# Patient Record
Sex: FEMALE | Race: WHITE | Employment: UNEMPLOYED | URBAN - METROPOLITAN AREA
[De-identification: names, ages, dates, MRNs, and addresses within clinical notes are randomized per-mention and may not be internally consistent; named-entity substitution may affect disease eponyms.]

---

## 2017-01-04 ENCOUNTER — ROUTINE PRENATAL (OUTPATIENT)
Dept: OBGYN CLINIC | Age: 32
End: 2017-01-04

## 2017-01-04 VITALS
DIASTOLIC BLOOD PRESSURE: 70 MMHG | HEIGHT: 65 IN | BODY MASS INDEX: 32.22 KG/M2 | WEIGHT: 193.4 LBS | SYSTOLIC BLOOD PRESSURE: 110 MMHG

## 2017-01-04 DIAGNOSIS — Z34.00 PRENATAL CARE OF PRIMIGRAVIDA, ANTEPARTUM: Primary | ICD-10-CM

## 2017-01-04 NOTE — PATIENT INSTRUCTIONS
Week 39 of Your Pregnancy: Care Instructions  Your Care Instructions    During these final weeks, you may feel anxious to see your new baby. [place] babies often look different from what you see in pictures or movies. Right after birth, their heads may have a strange shape. Their eyes may be puffy. And their genitals may be swollen. They may also have very dry skin, or red marks on the eyelids, nose, or neck. Still, most parents think their babies are beautiful. Follow-up care is a key part of your treatment and safety. Be sure to make and go to all appointments, and call your doctor if you are having problems. It's also a good idea to know your test results and keep a list of the medicines you take. How can you care for yourself at home? Prepare to breastfeed  · If you are breastfeeding, continue to eat healthy foods. · Avoid alcohol, cigarettes, and drugs. This includes prescription and over-the-counter medicines. · You can help prevent sore nipples if you feed your baby in the correct position. Nurses will help you learn to do this. · Your  will need to be fed about every 1½ to 3 hours. Choose the right birth control after your baby is born  · Women who are breastfeeding can still get pregnant. Use birth control if you don't want to get pregnant. · Intrauterine devices (IUDs) work for women who want to wait at least 2 years before getting pregnant again. They are safe to use while you are breastfeeding. · Depo-Provera can be used while you are breastfeeding. It is a shot you get every 3 months. · Birth control pills work well. But you need a different kind of pill while you are breastfeeding. And when you start taking these pills, you need to make sure to use another type of birth control until you start your second pack. · Diaphragms, cervical caps, tubal implants, and condoms with spermicide work less well after birth.  If you have a diaphragm or cervical cap, you will need to have it refitted. · Tubal ligation (tying your tubes) and vasectomy are both permanent. These are good options if you are sure you are done having children. Where can you learn more? Go to http://angélica-gulshan.info/. Enter T749 in the search box to learn more about \"Week 39 of Your Pregnancy: Care Instructions. \"  Current as of: May 30, 2016  Content Version: 11.1  © 1269-3817 Emefcy, [employer]. Care instructions adapted under license by [employer] (which disclaims liability or warranty for this information). If you have questions about a medical condition or this instruction, always ask your healthcare professional. [address] any warranty or liability for your use of this information.

## 2017-01-04 NOTE — MR AVS SNAPSHOT
Visit Information Date & Time Provider Department Dept. Phone Encounter #  
 1/4/2017  2:50 PM Rogelio Fitzgerald MD [facility] 90 [id number] Your Appointments 1/10/2017  3:10 PM  
OB VISIT with Rogelio Fitzgerald MD  
Sander Spivey (Gbariela Euceda) Appt Note: 1 wk fob  
 [address] [facility] [address] [facility] [id number]  
[facility] [address] Upcoming Health Maintenance Date Due  
 PAP AKA CERVICAL CYTOLOGY 2/5/2017 Allergies as of 1/4/2017  Review Complete On: 1/4/2017 By: Roel Cordova LPN No Known Allergies Current Immunizations  Never Reviewed Name Date Influenza Vaccine (Quad) PF 10/31/2016 Tdap 10/31/2016 Not reviewed this visit Vitals BP Height(growth percentile) Weight(growth percentile) LMP BMI OB Status 110/70 (BP 1 Location: Left arm, BP Patient Position: Sitting) 5' 5\" (1.651 m) 193 lb 6.4 oz (87.7 kg) 04/08/2016 (Approximate) 32.18 kg/m2 Pregnant Smoking Status Never Smoker Vitals History BMI and BSA Data Body Mass Index Body Surface Area  
 32.18 kg/m 2 2.01 m 2 Preferred Pharmacy Pharmacy Name Phone [facility], [address] [facility] Your Updated Medication List  
  
   
This list is accurate as of: 1/4/17  3:23 PM.  Always use your most recent med list.  
  
  
  
  
 FISH OIL PO Take  by mouth. ondansetron 4 mg disintegrating tablet Commonly known as:  ZOFRAN ODT Take 1 Tab by mouth every eight (8) hours as needed for Nausea. PRENATAL VITAMIN PO Take  by mouth. PROBIOTIC PO Take  by mouth. ZANTAC 75 75 mg tablet Generic drug:  raNITIdine Take 75 mg by mouth two (2) times a day. Patient Instructions Week 39 of Your Pregnancy: Care Instructions Your Care Instructions During these final weeks, you may feel anxious to see your new baby.  babies often look different from what you see in pictures or movies. Right after birth, their heads may have a strange shape. Their eyes may be puffy. And their genitals may be swollen. They may also have very dry skin, or red marks on the eyelids, nose, or neck. Still, most parents think their babies are beautiful. Follow-up care is a key part of your treatment and safety. Be sure to make and go to all appointments, and call your doctor if you are having problems. It's also a good idea to know your test results and keep a list of the medicines you take. How can you care for yourself at home? Prepare to breastfeed · If you are breastfeeding, continue to eat healthy foods. · Avoid alcohol, cigarettes, and drugs. This includes prescription and over-the-counter medicines. · You can help prevent sore nipples if you feed your baby in the correct position. Nurses will help you learn to do this. · Your  will need to be fed about every 1½ to 3 hours. Choose the right birth control after your baby is born · Women who are breastfeeding can still get pregnant. Use birth control if you don't want to get pregnant. · Intrauterine devices (IUDs) work for women who want to wait at least 2 years before getting pregnant again. They are safe to use while you are breastfeeding. · Depo-Provera can be used while you are breastfeeding. It is a shot you get every 3 months. · Birth control pills work well. But you need a different kind of pill while you are breastfeeding. And when you start taking these pills, you need to make sure to use another type of birth control until you start your second pack. · Diaphragms, cervical caps, tubal implants, and condoms with spermicide work less well after birth. If you have a diaphragm or cervical cap, you will need to have it refitted. · Tubal ligation (tying your tubes) and vasectomy are both permanent. These are good options if you are sure you are done having children. Where can you learn more? Go to http://angélica-gulshan.info/. Enter F055 in the search box to learn more about \"Week 39 of Your Pregnancy: Care Instructions. \" Current as of: May 30, 2016 Content Version: 11.1 © 4810-8620 EverCharge. Care instructions adapted under license by [employer] (which disclaims liability or warranty for this information). If you have questions about a medical condition or this instruction, always ask your healthcare professional. [address] any warranty or liability for your use of this information. Introducing [facility] & HEALTH SERVICES! Dear Neli Soto: 
Thank you for requesting a [employer] account. Our records indicate that you already have an active [employer] account. You can access your account anytime at [email or web address] Did you know that you can access your hospital and ER discharge instructions at any time in [employer]? You can also review all of your test results from your hospital stay or ER visit. Additional Information If you have questions, please visit the Frequently Asked Questions section of the [employer] website at [email or web address] Remember, [employer] is NOT to be used for urgent needs. For medical emergencies, dial 911. Now available from your iPhone and Android! Please provide this summary of care documentation to your next provider. Your primary care clinician is listed as FALLON ALFONSO. If you have any questions after today's visit, please call [phone].

## 2017-01-04 NOTE — PROGRESS NOTES
[facility]  [email or web address]  [phone]    Ana Laura Sauceda MD, FACOG     Follow-up OB visit    Vitals:    17 1515   BP: 110/70   Weight: 193 lb 6.4 oz (87.7 kg)   Height: 5' 5\" (1.651 m)       The patient reports the following concerns: contractions that come and go.cold symptoms. +cough/stuffy nose. Flu vaccine: received this season  Tdap: complete  (select n/a if first or second trimester)    See PN flowsheet for exam    28 y.o.  38w5d   Encounter Diagnosis   Name Primary?  Prenatal care of primigravida, antepartum Yes        [] SAB/bleeding precautions reviewed   [] PTL/PPROM precautions reviewed   [] Labor precautions reviewed   [] Fetal kick counts discussed   [] Labs reviewed with patient   [] Maira Garcia precautions reviewed   [] Consent reviewed   [] Handouts given to pt   [] Glucola handout    [] GBS/labor/Magic Hour handout   []    []    []    []    Follow-up Disposition:  Return in about 1 week (around 2017) for Follow up OB visit. No orders of the defined types were placed in this encounter.       Ana Laura Sauceda MD

## 2017-01-10 ENCOUNTER — ROUTINE PRENATAL (OUTPATIENT)
Dept: OBGYN CLINIC | Age: 32
End: 2017-01-10

## 2017-01-10 ENCOUNTER — TELEPHONE (OUTPATIENT)
Dept: OBGYN CLINIC | Age: 32
End: 2017-01-10

## 2017-01-10 VITALS
DIASTOLIC BLOOD PRESSURE: 78 MMHG | SYSTOLIC BLOOD PRESSURE: 100 MMHG | HEIGHT: 65 IN | WEIGHT: 196 LBS | BODY MASS INDEX: 32.65 KG/M2

## 2017-01-10 DIAGNOSIS — Z34.00 PRENATAL CARE OF PRIMIGRAVIDA, ANTEPARTUM: Primary | ICD-10-CM

## 2017-01-10 NOTE — MR AVS SNAPSHOT
Visit Information Date & Time Provider Department Dept. Phone Encounter #  
 1/10/2017  3:10 PM Abraham Garza MD [facility] [phone] Upcoming Health Maintenance Date Due  
 PAP AKA CERVICAL CYTOLOGY 2017 Allergies as of 1/10/2017  Review Complete On: 1/10/2017 By: Pastor Eligio LPN No Known Allergies Current Immunizations  Never Reviewed Name Date Influenza Vaccine (Quad) PF 10/31/2016 Tdap 10/31/2016 Not reviewed this visit Vitals BP Height(growth percentile) Weight(growth percentile) LMP BMI OB Status 100/78 5' 5\" (1.651 m) 196 lb (88.9 kg) 2016 (Approximate) 32.62 kg/m2 Pregnant Smoking Status Never Smoker BMI and BSA Data Body Mass Index Body Surface Area  
 32.62 kg/m 2 2.02 m 2 Preferred Pharmacy Pharmacy Name Phone [facility], [address]  Your Updated Medication List  
  
   
This list is accurate as of: 1/10/17  3:39 PM.  Always use your most recent med list.  
  
  
  
  
 FISH OIL PO Take  by mouth. ondansetron 4 mg disintegrating tablet Commonly known as:  ZOFRAN ODT Take 1 Tab by mouth every eight (8) hours as needed for Nausea. PRENATAL VITAMIN PO Take  by mouth. PROBIOTIC PO Take  by mouth. ZANTAC 75 75 mg tablet Generic drug:  raNITIdine Take 75 mg by mouth two (2) times a day. Patient Instructions Week 39 of Your Pregnancy: Care Instructions Your Care Instructions During these final weeks, you may feel anxious to see your new baby.  babies often look different from what you see in pictures or movies. Right after birth, their heads may have a strange shape. Their eyes may be puffy. And their genitals may be swollen. They may also have very dry skin, or red marks on the eyelids, nose, or neck.  Still, most parents think their babies are beautiful. Follow-up care is a key part of your treatment and safety. Be sure to make and go to all appointments, and call your doctor if you are having problems. It's also a good idea to know your test results and keep a list of the medicines you take. How can you care for yourself at home? Prepare to breastfeed · If you are breastfeeding, continue to eat healthy foods. · Avoid alcohol, cigarettes, and drugs. This includes prescription and over-the-counter medicines. · You can help prevent sore nipples if you feed your baby in the correct position. Nurses will help you learn to do this. · Your  will need to be fed about every 1½ to 3 hours. Choose the right birth control after your baby is born · Women who are breastfeeding can still get pregnant. Use birth control if you don't want to get pregnant. · Intrauterine devices (IUDs) work for women who want to wait at least 2 years before getting pregnant again. They are safe to use while you are breastfeeding. · Depo-Provera can be used while you are breastfeeding. It is a shot you get every 3 months. · Birth control pills work well. But you need a different kind of pill while you are breastfeeding. And when you start taking these pills, you need to make sure to use another type of birth control until you start your second pack. · Diaphragms, cervical caps, tubal implants, and condoms with spermicide work less well after birth. If you have a diaphragm or cervical cap, you will need to have it refitted. · Tubal ligation (tying your tubes) and vasectomy are both permanent. These are good options if you are sure you are done having children. Where can you learn more? Go to http://angélica-gulshan.info/. Enter I256 in the search box to learn more about \"Week 39 of Your Pregnancy: Care Instructions. \" Current as of: May 30, 2016 Content Version: 11.1 © 8587-2430 Healthwise, Incorporated. Care instructions adapted under license by Internal Gaming (which disclaims liability or warranty for this information). If you have questions about a medical condition or this instruction, always ask your healthcare professional. [address] any warranty or liability for your use of this information. Introducing [facility] & HEALTH SERVICES! Dear Fiona Dunbar: 
Thank you for requesting a [employer] account. Our records indicate that you already have an active [employer] account. You can access your account anytime at [email or web address] Did you know that you can access your hospital and ER discharge instructions at any time in Global Loc[employer]? You can also review all of your test results from your hospital stay or ER visit. Additional Information If you have questions, please visit the Frequently Asked Questions section of the Global [employer] website at [email or web address] Remember, [employer] is NOT to be used for urgent needs. For medical emergencies, dial 911. Now available from your iPhone and Android! Please provide this summary of care documentation to your next provider. Your primary care clinician is listed as FALLON ALFONSO. If you have any questions after today's visit, please call [phone].

## 2017-01-10 NOTE — PROGRESS NOTES
[facility]  [email or web address]  [phone]    Darryle Silvius, MD, FACOG     Follow-up OB visit    Vitals:    01/10/17 1534   BP: 100/78   Weight: 196 lb (88.9 kg)   Height: 5' 5\" (1.651 m)       The patient reports the following concerns: Patient reports cramping that subsided this morning. Flu vaccine: received this season  Tdap: complete  (select n/a if first or second trimester)    See PN flowsheet for exam    28 y.o.  39w4d   Encounter Diagnosis   Name Primary?  Prenatal care of primigravida, antepartum Yes        [] SAB/bleeding precautions reviewed   [] PTL/PPROM precautions reviewed   [] Labor precautions reviewed   [] Fetal kick counts discussed   [] Labs reviewed with patient   [] Gerardo Tessie precautions reviewed   [] Consent reviewed   [] Handouts given to pt   [] Glucola handout    [] GBS/labor/Magic Hour handout   []    []    []    []    Follow-up Disposition:  Return in about 1 week (around 2017) for Follow up OB visit. No orders of the defined types were placed in this encounter.       Darryle Silvius, MD

## 2017-01-10 NOTE — PATIENT INSTRUCTIONS
Week 39 of Your Pregnancy: Care Instructions  Your Care Instructions    During these final weeks, you may feel anxious to see your new baby. [place] babies often look different from what you see in pictures or movies. Right after birth, their heads may have a strange shape. Their eyes may be puffy. And their genitals may be swollen. They may also have very dry skin, or red marks on the eyelids, nose, or neck. Still, most parents think their babies are beautiful. Follow-up care is a key part of your treatment and safety. Be sure to make and go to all appointments, and call your doctor if you are having problems. It's also a good idea to know your test results and keep a list of the medicines you take. How can you care for yourself at home? Prepare to breastfeed  · If you are breastfeeding, continue to eat healthy foods. · Avoid alcohol, cigarettes, and drugs. This includes prescription and over-the-counter medicines. · You can help prevent sore nipples if you feed your baby in the correct position. Nurses will help you learn to do this. · Your  will need to be fed about every 1½ to 3 hours. Choose the right birth control after your baby is born  · Women who are breastfeeding can still get pregnant. Use birth control if you don't want to get pregnant. · Intrauterine devices (IUDs) work for women who want to wait at least 2 years before getting pregnant again. They are safe to use while you are breastfeeding. · Depo-Provera can be used while you are breastfeeding. It is a shot you get every 3 months. · Birth control pills work well. But you need a different kind of pill while you are breastfeeding. And when you start taking these pills, you need to make sure to use another type of birth control until you start your second pack. · Diaphragms, cervical caps, tubal implants, and condoms with spermicide work less well after birth.  If you have a diaphragm or cervical cap, you will need to have it refitted. · Tubal ligation (tying your tubes) and vasectomy are both permanent. These are good options if you are sure you are done having children. Where can you learn more? Go to http://angélica-gulshan.info/. Enter Q633 in the search box to learn more about \"Week 39 of Your Pregnancy: Care Instructions. \"  Current as of: May 30, 2016  Content Version: 11.1  © 4262-4024 Lingua.ly, [employer]x Therapeutics. Care instructions adapted under license by Pitchbrite (which disclaims liability or warranty for this information). If you have questions about a medical condition or this instruction, always ask your healthcare professional. [address] any warranty or liability for your use of this information.

## 2017-01-13 ENCOUNTER — ROUTINE PRENATAL (OUTPATIENT)
Dept: OBGYN CLINIC | Age: 32
End: 2017-01-13

## 2017-01-13 ENCOUNTER — TELEPHONE (OUTPATIENT)
Dept: OBGYN CLINIC | Age: 32
End: 2017-01-13

## 2017-01-13 VITALS
DIASTOLIC BLOOD PRESSURE: 82 MMHG | SYSTOLIC BLOOD PRESSURE: 118 MMHG | WEIGHT: 198 LBS | HEIGHT: 65 IN | BODY MASS INDEX: 32.99 KG/M2

## 2017-01-13 DIAGNOSIS — O26.893 ABDOMINAL PAIN IN PREGNANCY, THIRD TRIMESTER: ICD-10-CM

## 2017-01-13 DIAGNOSIS — R10.9 ABDOMINAL PAIN IN PREGNANCY, THIRD TRIMESTER: ICD-10-CM

## 2017-01-13 DIAGNOSIS — Z34.00 PRENATAL CARE OF PRIMIGRAVIDA, ANTEPARTUM: Primary | ICD-10-CM

## 2017-01-13 NOTE — PATIENT INSTRUCTIONS
Edna Prim Contractions: Care Instructions  Your Care Instructions  [place] Hernandez contractions prepare your uterus for labor. Think of them as a \"warm-up\" exercise that your body does. You may begin to feel them between the 28th and 30th weeks of your pregnancy. But they start as early as the 20th week. Gerardo Hernandez contractions usually occur more often during the ninth month. They may go away when you are active and return when you rest. These contractions are like mild contractions of true labor, but they occur less often. (You feel fewer than 8 in an hour.) They don't cause your cervix to open. It may be hard for you to tell the difference between Edna Prim contractions and true labor, especially in your first pregnancy. Follow-up care is a key part of your treatment and safety. Be sure to make and go to all appointments, and call your doctor if you are having problems. It's also a good idea to know your test results and keep a list of the medicines you take. How can you care for yourself at home? · Try a warm bath to help relieve muscle tension and reduce pain. · Change positions every 30 minutes. Take breaks if you must sit for a long time. Get up and walk around. · Drink plenty of water, enough so that your urine is light yellow or clear like water. · Taking short walks may help you feel better. Your doctor needs to check any contractions that are getting stronger or closer together. Where can you learn more? Go to http://angélica-gulshan.info/. Enter [id number] in the search box to learn more about \"Cumberland Hernandez Contractions: Care Instructions. \"  Current as of: May 30, 2016  Content Version: 11.1  © 2932-1174 PellePharm. Care instructions adapted under license by [employer] (which disclaims liability or warranty for this information).  If you have questions about a medical condition or this instruction, always ask your healthcare professional. Synbiota, Incorporated disclaims any warranty or liability for your use of this information.

## 2017-01-13 NOTE — PROGRESS NOTES
[address] [facility]  [email or web address]  [phone]    Jaida Murphy MD, FACOG       OB/GYN: [facility] Problem visit    Chief Complaint:   Chief Complaint   Patient presents with    Pregnancy Problem       Patient Active Problem List    Diagnosis    Supervision of normal first pregnancy     Flu shot at work 10/2015  Declined NT, interested in Anthony wnl  NT: WNL 2016  Gender XY, post plac  Urine cx negative 2016  Mother: L and D nurse  Induction 17. Pt aware. Waitlisted.  Polycystic ovaries       History of Present Illness: The patient is a 28 y.o.  female who reports having contractions x 1 day. States that contractions were 7 minutes apart from 11:00p.m to 1:30am on 2017. Patient states that she has not had any contractions today. This is a new problem. This is not a routinely scheduled OB appointment. She wants cx checked and membranes swept. She reports the symptoms are has moderately improved. Aggravating factors include none. Alleviating factors include none. She does not have other concerns. PFSH:  Past Medical History   Diagnosis Date    Asthma     Fibrocystic breast disease 3/2012    Pap smear for cervical cancer screening 2016     negative    PCOS (polycystic ovarian syndrome)     Polycystic disease, ovaries      No past surgical history on file.   Family History   Problem Relation Age of Onset    Other Sister      PCOS    Thyroid Disease Sister      hypothyroid, prolactinoma    Breast Cancer Paternal Aunt     Colon Cancer Maternal Grandfather     Cancer Paternal Grandmother      melanoma    Diabetes Paternal Grandfather     Heart Disease Paternal Grandfather     Diabetes Father     No Known Problems Mother      Social History   Substance Use Topics    Smoking status: Never Smoker    Smokeless tobacco: Never Used    Alcohol use No     No Known Allergies  Current Outpatient Prescriptions   Medication Sig    PRENATAL VIT W-CA,FE,FA,<1 MG, (PRENATAL VITAMIN PO) Take  by mouth.  LACTOBACILLUS ACIDOPHILUS (PROBIOTIC PO) Take  by mouth.  ondansetron (ZOFRAN ODT) 4 mg disintegrating tablet Take 1 Tab by mouth every eight (8) hours as needed for Nausea.  raNITIdine (ZANTAC 75) 75 mg tablet Take 75 mg by mouth two (2) times a day.  DOCOSAHEXANOIC ACID/EPA (FISH OIL PO) Take  by mouth. No current facility-administered medications for this visit.         Review of Systems:  History obtained from the patient and written ROS questionnaire  Constitutional: negative for fevers, chills and weight loss  ENT ROS: negative for - hearing change, oral lesions or visual changes  Respiratory: negative for cough, wheezing or dyspnea on exertion  Cardiovascular: negative for chest pain, irregular heart beats, exertional chest pressure/discomfort  Gastrointestinal: negative for dysphagia, nausea and vomiting  Genito-Urinary ROS: no dysuria, trouble voiding, or hematuria, see HPI  Inteument/breast: negative for rash, breast lump and nipple discharge  Musculoskeletal:negative for stiff joints, neck pain and muscle weakness  Endocrine ROS: negative for - breast changes, galactorrhea or temperature intolerance  Hematological and Lymphatic ROS: negative for - blood clots, bruising or swollen lymph nodes    Physical Exam:  Visit Vitals    /82    Ht 5' 5\" (1.651 m)    Wt 198 lb (89.8 kg)    BMI 32.95 kg/m2       GENERAL: alert, well appearing, and in no distress  HEAD; normocephalic, atraumatic  ABDOMEN: soft, nontender, nondistended, no masses or organomegaly   BACK: normal range of motion, no tenderness, no CVAT   EGBUS: no lesions, no inflammation, no masses  VULVA: normal appearing vulva with no masses, tenderness or lesions  VAGINA: normal appearing vagina with normal color, no lesions, no discharge  CERVIX: normal appearing cervix without discharge or lesions, non tender, mid position  UTERUS: uterus is enlarged in size, gravid appropriate for gestational age  [de-identified]: normal adnexa in size, nontender and no masses  NEURO: alert, oriented, normal speech    See PN flowsheet for additional notes and exam    Assessment:  28 y.o.  40w0d   Encounter Diagnoses   Name Primary?  Prenatal care of primigravida, antepartum Yes       Plan:  An evaluation of this patient's concern is planned. The patient is advised that she should contact the office if she does not note improvement or if symptoms recur  She should contact our office with any questions or concerns  She could keep her routine OB appointment. Disc rba of sweeping membranes and nipple stimulation,   Labor prec  Bleeding prec, may have inc with sweeping membranes    No orders of the defined types were placed in this encounter. No results found for this visit on 17.     Patrick Da Silva MD

## 2017-01-13 NOTE — TELEPHONE ENCOUNTER
28 year  40w0d pregnant patient calling to see if she can get an appt to see Dr. aBrney Shepherd. Patient states she was blanca every 7 minutes last night. Patient is not blanca today. She denies ROM and vaginal bleeding. Patient has +FM. Per TP patient can come into office at 11:00am to be seen. Patient verbalized understanding.

## 2017-01-13 NOTE — MR AVS SNAPSHOT
Visit Information Date & Time Provider Department Dept. Phone Encounter #  
 1/13/2017 11:00 AM MD Sander Walsh [phone] [id number] Your Appointments 1/19/2017 10:50 AM  
OB VISIT with MD Sander Walsh ([facility]) Appt Note: 1 wk fob  
 [address] [place] [address] [address]  
  
    
 1/23/2017  7:00 AM  
PROCEDURE with MD Sander Walsh ([facility]) Appt Note: Induction (waitlist) [address] [facility] [phone]  
[address] [place] [address] [address] Upcoming Health Maintenance Date Due  
 PAP AKA CERVICAL CYTOLOGY 2/5/2017 Allergies as of 1/13/2017  Review Complete On: 1/13/2017 By: Angelia Morgan LPN No Known Allergies Current Immunizations  Never Reviewed Name Date Influenza Vaccine (Quad) PF 10/31/2016 Tdap 10/31/2016 Not reviewed this visit Vitals BP Height(growth percentile) Weight(growth percentile) LMP BMI OB Status 118/82 5' 5\" (1.651 m) 198 lb (89.8 kg) 04/08/2016 (Approximate) 32.95 kg/m2 Pregnant Smoking Status Never Smoker BMI and BSA Data Body Mass Index Body Surface Area 32.95 kg/m 2 2.03 m 2 Preferred Pharmacy Pharmacy Name Phone [facility], [address] [facility] Your Updated Medication List  
  
   
This list is accurate as of: 1/13/17 12:08 PM.  Always use your most recent med list.  
  
  
  
  
 FISH OIL PO Take  by mouth. ondansetron 4 mg disintegrating tablet Commonly known as:  ZOFRAN ODT Take 1 Tab by mouth every eight (8) hours as needed for Nausea. PRENATAL VITAMIN PO Take  by mouth.   
  
 PROBIOTIC PO  
 Take  by mouth. ZANTAC 75 75 mg tablet Generic drug:  raNITIdine Take 75 mg by mouth two (2) times a day. Patient Instructions Spencer Massiel Contractions: Care Instructions Your Care Instructions [place] contractions prepare your uterus for labor. Think of them as a \"warm-up\" exercise that your body does. You may begin to feel them between the 28th and 30th weeks of your pregnancy. But they start as early as the 20th week. Gerardo Hernandez contractions usually occur more often during the ninth month. They may go away when you are active and return when you rest. These contractions are like mild contractions of true labor, but they occur less often. (You feel fewer than 8 in an hour.) They don't cause your cervix to open. It may be hard for you to tell the difference between Spencer Massiel contractions and true labor, especially in your first pregnancy. Follow-up care is a key part of your treatment and safety. Be sure to make and go to all appointments, and call your doctor if you are having problems. It's also a good idea to know your test results and keep a list of the medicines you take. How can you care for yourself at home? · Try a warm bath to help relieve muscle tension and reduce pain. · Change positions every 30 minutes. Take breaks if you must sit for a long time. Get up and walk around. · Drink plenty of water, enough so that your urine is light yellow or clear like water. · Taking short walks may help you feel better. Your doctor needs to check any contractions that are getting stronger or closer together. Where can you learn more? Go to http://angélica-gulshan.info/. Enter [phone] in the search box to learn more about \"[place] Contractions: Care Instructions. \" Current as of: May 30, 2016 Content Version: 11.1 © 8763-2036 [employer].  Care instructions adapted under license by [address] (which disclaims liability or warranty for this information). If you have questions about a medical condition or this instruction, always ask your healthcare professional. [address] any warranty or liability for your use of this information. Introducing [facility]! Dear Amina Morales: 
Thank you for requesting a Gutenbergz account. Our records indicate that you already have an active Gutenbergz account. You can access your account anytime at [email or web address] Did you know that you can access your hospital and ER discharge instructions at any time in Gutenbergz? You can also review all of your test results from your hospital stay or ER visit. Additional Information If you have questions, please visit the Frequently Asked Questions section of the Gutenbergz website at [email or web address] Remember, Gutenbergz is NOT to be used for urgent needs. For medical emergencies, dial 911. Now available from your iPhone and Android! Please provide this summary of care documentation to your next provider. Your primary care clinician is listed as FALLON ALFONSO. If you have any questions after today's visit, please call [phone].

## 2017-01-15 ENCOUNTER — ANESTHESIA (OUTPATIENT)
Dept: LABOR AND DELIVERY | Age: 32
End: 2017-01-15
Payer: COMMERCIAL

## 2017-01-15 ENCOUNTER — ANESTHESIA EVENT (OUTPATIENT)
Dept: LABOR AND DELIVERY | Age: 32
End: 2017-01-15
Payer: COMMERCIAL

## 2017-01-15 ENCOUNTER — HOSPITAL ENCOUNTER (INPATIENT)
Age: 32
LOS: 2 days | Discharge: HOME | End: 2017-01-17
Attending: OBSTETRICS & GYNECOLOGY | Admitting: OBSTETRICS & GYNECOLOGY
Payer: COMMERCIAL

## 2017-01-15 PROBLEM — Z34.90 PREGNANCY: Status: ACTIVE | Noted: 2017-01-15

## 2017-01-15 LAB
BASOPHILS # BLD AUTO: 0 K/UL (ref 0–0.1)
BASOPHILS # BLD: 0 % (ref 0–1)
DAILY QC (YES/NO)?: YES
EOSINOPHIL # BLD: 0.1 K/UL (ref 0–0.4)
EOSINOPHIL NFR BLD: 1 % (ref 0–7)
ERYTHROCYTE [DISTWIDTH] IN BLOOD BY AUTOMATED COUNT: 13.4 % (ref 11.5–14.5)
HCT VFR BLD AUTO: 37.3 % (ref 35–47)
HGB BLD-MCNC: 12.2 G/DL (ref 11.5–16)
LYMPHOCYTES # BLD AUTO: 24 % (ref 12–49)
LYMPHOCYTES # BLD: 2.9 K/UL (ref 0.8–3.5)
MCH RBC QN AUTO: 28.6 PG (ref 26–34)
MCHC RBC AUTO-ENTMCNC: 32.7 G/DL (ref 30–36.5)
MCV RBC AUTO: 87.4 FL (ref 80–99)
MONOCYTES # BLD: 1 K/UL (ref 0–1)
MONOCYTES NFR BLD AUTO: 9 % (ref 5–13)
NEUTS SEG # BLD: 7.8 K/UL (ref 1.8–8)
NEUTS SEG NFR BLD AUTO: 66 % (ref 32–75)
PH, VAGINAL FLUID: 7 (ref 5–6.1)
PLATELET # BLD AUTO: 231 K/UL (ref 150–400)
RBC # BLD AUTO: 4.27 M/UL (ref 3.8–5.2)
WBC # BLD AUTO: 11.8 K/UL (ref 3.6–11)

## 2017-01-15 PROCEDURE — 74011250636 HC RX REV CODE- 250/636

## 2017-01-15 PROCEDURE — 76060000078 HC EPIDURAL ANESTHESIA: Performed by: ANESTHESIOLOGY

## 2017-01-15 PROCEDURE — 77010026065 HC OXYGEN MINIMUM MEDICAL AIR: Performed by: OBSTETRICS & GYNECOLOGY

## 2017-01-15 PROCEDURE — 74011250637 HC RX REV CODE- 250/637: Performed by: OBSTETRICS & GYNECOLOGY

## 2017-01-15 PROCEDURE — 75810000275 HC EMERGENCY DEPT VISIT NO LEVEL OF CARE

## 2017-01-15 PROCEDURE — 75410000003 HC RECOV DEL/VAG/CSECN EA 0.5 HR: Performed by: OBSTETRICS & GYNECOLOGY

## 2017-01-15 PROCEDURE — 77030034850

## 2017-01-15 PROCEDURE — 83986 ASSAY PH BODY FLUID NOS: CPT | Performed by: OBSTETRICS & GYNECOLOGY

## 2017-01-15 PROCEDURE — 75410000002 HC LABOR FEE PER 1 HR: Performed by: OBSTETRICS & GYNECOLOGY

## 2017-01-15 PROCEDURE — 77030014125 HC TY EPDRL BBMI -B: Performed by: ANESTHESIOLOGY

## 2017-01-15 PROCEDURE — 65270000029 HC RM PRIVATE

## 2017-01-15 PROCEDURE — 74011250636 HC RX REV CODE- 250/636: Performed by: OBSTETRICS & GYNECOLOGY

## 2017-01-15 PROCEDURE — 74011000250 HC RX REV CODE- 250

## 2017-01-15 PROCEDURE — 4A1HXCZ MONITORING OF PRODUCTS OF CONCEPTION, CARDIAC RATE, EXTERNAL APPROACH: ICD-10-PCS | Performed by: OBSTETRICS & GYNECOLOGY

## 2017-01-15 PROCEDURE — 85025 COMPLETE CBC W/AUTO DIFF WBC: CPT | Performed by: OBSTETRICS & GYNECOLOGY

## 2017-01-15 PROCEDURE — 99282 EMERGENCY DEPT VISIT SF MDM: CPT | Performed by: OBSTETRICS & GYNECOLOGY

## 2017-01-15 PROCEDURE — 75410000000 HC DELIVERY VAGINAL/SINGLE: Performed by: OBSTETRICS & GYNECOLOGY

## 2017-01-15 PROCEDURE — 0KQM0ZZ REPAIR PERINEUM MUSCLE, OPEN APPROACH: ICD-10-PCS | Performed by: OBSTETRICS & GYNECOLOGY

## 2017-01-15 PROCEDURE — 74011250636 HC RX REV CODE- 250/636: Performed by: ANESTHESIOLOGY

## 2017-01-15 PROCEDURE — 36415 COLL VENOUS BLD VENIPUNCTURE: CPT | Performed by: OBSTETRICS & GYNECOLOGY

## 2017-01-15 RX ORDER — OXYTOCIN/RINGER'S LACTATE 20/1000 ML
125-500 PLASTIC BAG, INJECTION (ML) INTRAVENOUS ONCE
Status: ACTIVE | OUTPATIENT
Start: 2017-01-15 | End: 2017-01-15

## 2017-01-15 RX ORDER — LOPERAMIDE HYDROCHLORIDE 2 MG/1
2 CAPSULE ORAL
Status: DISCONTINUED | OUTPATIENT
Start: 2017-01-15 | End: 2017-01-17 | Stop reason: HOSPADM

## 2017-01-15 RX ORDER — NALOXONE HYDROCHLORIDE 0.4 MG/ML
0.4 INJECTION, SOLUTION INTRAMUSCULAR; INTRAVENOUS; SUBCUTANEOUS AS NEEDED
Status: DISCONTINUED | OUTPATIENT
Start: 2017-01-15 | End: 2017-01-15

## 2017-01-15 RX ORDER — OXYTOCIN IN 5 % DEXTROSE 30/500 ML
1-25 PLASTIC BAG, INJECTION (ML) INTRAVENOUS
Status: DISCONTINUED | OUTPATIENT
Start: 2017-01-15 | End: 2017-01-15 | Stop reason: SDUPTHER

## 2017-01-15 RX ORDER — FENTANYL/BUPIVACAINE/NS/PF 2-1250MCG
1-16 PREFILLED PUMP RESERVOIR EPIDURAL CONTINUOUS
Status: DISCONTINUED | OUTPATIENT
Start: 2017-01-15 | End: 2017-01-15

## 2017-01-15 RX ORDER — MAG HYDROX/ALUMINUM HYD/SIMETH 200-200-20
30 SUSPENSION, ORAL (FINAL DOSE FORM) ORAL
Status: DISCONTINUED | OUTPATIENT
Start: 2017-01-15 | End: 2017-01-15

## 2017-01-15 RX ORDER — OXYTOCIN IN 5 % DEXTROSE 30/500 ML
1-25 PLASTIC BAG, INJECTION (ML) INTRAVENOUS
Status: DISCONTINUED | OUTPATIENT
Start: 2017-01-15 | End: 2017-01-15

## 2017-01-15 RX ORDER — ZOLPIDEM TARTRATE 5 MG/1
5 TABLET ORAL
Status: DISCONTINUED | OUTPATIENT
Start: 2017-01-15 | End: 2017-01-17 | Stop reason: HOSPADM

## 2017-01-15 RX ORDER — OXYTOCIN IN 5 % DEXTROSE 30/500 ML
PLASTIC BAG, INJECTION (ML) INTRAVENOUS
Status: COMPLETED
Start: 2017-01-15 | End: 2017-01-15

## 2017-01-15 RX ORDER — NALOXONE HYDROCHLORIDE 0.4 MG/ML
0.4 INJECTION, SOLUTION INTRAMUSCULAR; INTRAVENOUS; SUBCUTANEOUS AS NEEDED
Status: DISCONTINUED | OUTPATIENT
Start: 2017-01-15 | End: 2017-01-17 | Stop reason: HOSPADM

## 2017-01-15 RX ORDER — SODIUM CHLORIDE, SODIUM LACTATE, POTASSIUM CHLORIDE, CALCIUM CHLORIDE 600; 310; 30; 20 MG/100ML; MG/100ML; MG/100ML; MG/100ML
125 INJECTION, SOLUTION INTRAVENOUS CONTINUOUS
Status: DISCONTINUED | OUTPATIENT
Start: 2017-01-15 | End: 2017-01-15

## 2017-01-15 RX ORDER — IBUPROFEN 600 MG/1
600 TABLET ORAL
Status: DISCONTINUED | OUTPATIENT
Start: 2017-01-15 | End: 2017-01-17 | Stop reason: HOSPADM

## 2017-01-15 RX ORDER — HYDROCORTISONE ACETATE PRAMOXINE HCL 2.5; 1 G/100G; G/100G
CREAM TOPICAL AS NEEDED
Status: DISCONTINUED | OUTPATIENT
Start: 2017-01-15 | End: 2017-01-17 | Stop reason: HOSPADM

## 2017-01-15 RX ORDER — SODIUM CHLORIDE 0.9 % (FLUSH) 0.9 %
5-10 SYRINGE (ML) INJECTION AS NEEDED
Status: DISCONTINUED | OUTPATIENT
Start: 2017-01-15 | End: 2017-01-15

## 2017-01-15 RX ORDER — BUPIVACAINE HYDROCHLORIDE 2.5 MG/ML
INJECTION, SOLUTION EPIDURAL; INFILTRATION; INTRACAUDAL AS NEEDED
Status: DISCONTINUED | OUTPATIENT
Start: 2017-01-15 | End: 2017-01-15 | Stop reason: HOSPADM

## 2017-01-15 RX ADMIN — SODIUM CHLORIDE, SODIUM LACTATE, POTASSIUM CHLORIDE, AND CALCIUM CHLORIDE 1000 ML: 600; 310; 30; 20 INJECTION, SOLUTION INTRAVENOUS at 03:00

## 2017-01-15 RX ADMIN — IBUPROFEN 600 MG: 800 TABLET, FILM COATED ORAL at 20:10

## 2017-01-15 RX ADMIN — BUPIVACAINE HYDROCHLORIDE 10 ML: 2.5 INJECTION, SOLUTION EPIDURAL; INFILTRATION; INTRACAUDAL at 03:20

## 2017-01-15 RX ADMIN — Medication 2 MILLI-UNITS/MIN: at 08:52

## 2017-01-15 RX ADMIN — SODIUM CHLORIDE, SODIUM LACTATE, POTASSIUM CHLORIDE, AND CALCIUM CHLORIDE 500 ML: 600; 310; 30; 20 INJECTION, SOLUTION INTRAVENOUS at 05:09

## 2017-01-15 RX ADMIN — SODIUM CHLORIDE, SODIUM LACTATE, POTASSIUM CHLORIDE, AND CALCIUM CHLORIDE 1000 ML: 600; 310; 30; 20 INJECTION, SOLUTION INTRAVENOUS at 02:10

## 2017-01-15 RX ADMIN — MUPIROCIN: 20 OINTMENT TOPICAL at 22:00

## 2017-01-15 RX ADMIN — SODIUM CHLORIDE, SODIUM LACTATE, POTASSIUM CHLORIDE, AND CALCIUM CHLORIDE 125 ML/HR: 600; 310; 30; 20 INJECTION, SOLUTION INTRAVENOUS at 03:33

## 2017-01-15 RX ADMIN — IBUPROFEN 800 MG: 800 TABLET, FILM COATED ORAL at 13:18

## 2017-01-15 RX ADMIN — SODIUM CHLORIDE, SODIUM LACTATE, POTASSIUM CHLORIDE, AND CALCIUM CHLORIDE 125 ML/HR: 600; 310; 30; 20 INJECTION, SOLUTION INTRAVENOUS at 02:09

## 2017-01-15 RX ADMIN — FENTANYL 0.2 MG/100ML-BUPIV 0.125%-NACL 0.9% EPIDURAL INJ 10 ML/HR: 2/0.125 SOLUTION at 03:43

## 2017-01-15 NOTE — LACTATION NOTE
Assisted mother with waking sleepy baby, positioning, and latch. Baby latched but would not suck. Showed mother how to roll nipple to lorena and how to hand express drops. BF basics reviewed. Encouraged skin to skin, unlimited time at breast, hand expression, and not going over 3 hours between feeds. Reviewed breastfeeding basics:  How milk is made and normal  breastfeeding behaviors discussed. Supply and demand,  stomach size, early feeding cues, skin to skin bonding with comfortable positioning and baby led latch-on reviewed. How to identify signs of successful breastfeeding sessions reviewed; education on assymetrical latch, signs of effective latching vs shallow, in-effective latching, normal  feeding frequency and duration and expected infant output discussed. Normal course of breastfeeding discussed including the AAP's recommendation that children receive exclusive breast milk feedings for the first six months of life with breast milk feedings to continue through the first year of life and/or beyond as complimentary table foods are added. Breastfeeding Booklet and Warm line information provided with discussion. Discussed typical  weight loss and the importance of pediatrician appointment within 24-48 hours of discharge, at 2 weeks of life and normalcy of requesting pediatric weight checks as needed in between visits. Hand Expression Education:  Mom taught how to manually hand express her colostrum. Emphasized the importance of providing infant with valuable colostrum as infant rests skin to skin at breast.  Aware to avoid extended periods of non-feeding. Aware to offer 10-20+ drops of colostrum every 2-3 hours until infant is latching and nursing effectively. Taught the rationale behind this low tech but highly effective evidence based practice.     Pt will successfully establish breastfeeding by feeding in response to early feeding cues   or wake every 3h, will obtain deep latch, and will keep log of feedings/output. Taught to BF at hunger cues and or q 2-3 hrs and to offer 10-20 drops of hand expressed colostrum at any non-feeds.       Breast Assessment  Left Breast: Medium  Left Nipple: Everted, Short, Intact  Right Breast: Medium  Right Nipple: Everted, Intact, Short  Breast- Feeding Assessment  Breast-Feeding Experience: No  Breast Trauma/Surgery: No  Type/Quality: Good  Lactation Consultant Visits  Breast-Feedings: Good   Mother/Infant Observation  Mother Observation: Breast comfortable, Holds breast, Lets baby end feeding, Nipple round on release, Recognizes feeding cues, Sleepy after feeding  Infant Observation: Rhythmic suck, Relaxed after feeding, Opens mouth, Lips flanged, upper, Lips flanged, lower, Latches nipple and aereolae, Feeding cues  LATCH Documentation  Latch: Repeated attempts, hold nipple in mouth, stimulate to suck  Audible Swallowing: A few with stimulation  Type of Nipple: Flat (nipple everts with stim)  Comfort (Breast/Nipple): Soft/non-tender  Hold (Positioning): Full assist, teach one side, mother does other, staff holds  [facility] Score: 6

## 2017-01-15 NOTE — L&D DELIVERY NOTE
Delivery Summary    Patient: Zena Diaz MRN: [id number]  SSN: [id number]    YOB: 1985  Age: 28 y.o. Sex: female        Labor Events:    Labor: No    Rupture Date: 1/15/2017    Rupture Time: 12:00 AM    Rupture Type SROM    Amniotic Fluid Volume: Moderate     Amniotic Fluid Description: Clear       Induction: None         Augmentation: Oxytocin    Labor Complications: None     Additional Complications:        Cervical Ripening:       None      Delivery Events:  Episiotomy: None    Laceration(s): Second degree perineal  skid without repair    Repaired: Yes     Number of Repair Packets: 3    Suture Type and Size: Chromic 2-0  Vicryl 3-0        Estimated Blood Loss (ml):    300 ml       Information for the patient's newbornJozef JainantoinettempMilan [[id number]]     Delivery Summary - Baby    Delivery Date: 1/15/2017   Delivery Time: 9:26 AM   Delivery Type: Vaginal, Spontaneous Delivery  Sex:  male  Gestational Age: 41w4d  Delivery Clinician:  Brigette Pena?: Yes   Delivery Location: L&D             APGARS  One minute Five minutes Ten minutes   Skin Color: 1    1       Heart Rate: 2   2         Reflex Irritability: 2   2         Muscle Tone: 2   2       Respiration: 2   2         Total: 9   9           Presentation: Vertex  Position:        Resuscitation Method:  Tactile Stimulation;Suctioning-bulb     Meconium Stained: None    Cord Information: 3 Vessels   Complications: None  Cord Blood Sent?:  No    Blood Gases Sent?:  No    Placenta:  Date/Time: 1/15  9:29 AM  Removal: Spontaneous      Appearance: Normal      Measurements:  Birth Weight:      Birth Length:     Head Circumference:       Chest Circumference:      Abdominal Girth: Other Providers:    SHAZIA Spring [place], [place] M Obstetrician;Primary Nurse;Primary [place] Nurse;Charge Nurse           Cord Blood Results:  Information for the patient's :  Milan Abbasi [[id number]]   No results found for: Kwasi Walsh, PCTDIG, BILI, ABORHEXT, [address]    Information for the patient's :  Josue Reagan, Male [[id number]]   No results found for: APH, APCO2, APO2, AHCO3, ABEC, ABDC, O2ST, [place], [place], PHI, Mary, Chas[place], HCO3I, SO2I, IBD     Information for the patient's newbornJustina Fernandezevita, Male [[id number]]   No results found for: EPHV, PCO2V, PO2V, HCO3V, O2STV, EBDV

## 2017-01-15 NOTE — PROGRESS NOTES
0145: Dr. Evette Tomas notified of patients arrival, complaints, positive nitrazine results, SVE and desire for epidural. Orders received.    0300: Dr. Leila Chua notified patient is ready for epidural. MD states he will come see patient shortly. Ari Vicente 9781: Dr. Leila Chua at bedside to evaluate patient for epidural placement. 0308: Patient assisted to the sitting position at side of bed for epidural placement. RN assisting patient for positioning.    4994: Patient assisted to the left tilt position with a pillow under right hip after epidural placement. Patient tolerated procedure well. EFM readjusted. 6491: Patient repositioned to right tilt with pillow under left hip/back for support and between knees. EFM readjusted. 0806: Patient repositioned to left tilt with pillow under right hip/back for support and between knees. EFM readjusted. 4748: Dr. Brigid Hernandez at bedside evaluating patient and discussing plan of care.

## 2017-01-15 NOTE — ANESTHESIA PREPROCEDURE EVALUATION
Anesthetic History   No history of anesthetic complications            Review of Systems / Medical History  Patient summary reviewed and pertinent labs reviewed    Pulmonary            Asthma     Comments: Exercised asthma   Neuro/Psych   Within defined limits           Cardiovascular  Within defined limits                Exercise tolerance: >4 METS     GI/Hepatic/Renal  Within defined limits              Endo/Other  Within defined limits           Other Findings              Physical Exam    Airway    TM Distance: 4 - 6 cm  Neck ROM: normal range of motion   Mouth opening: Normal     Cardiovascular    Rhythm: regular  Rate: normal         Dental         Pulmonary                 Abdominal         Other Findings            Anesthetic Plan    ASA: 2  Anesthesia type: epidural            Anesthetic plan and risks discussed with: Patient

## 2017-01-15 NOTE — PROGRESS NOTES
7722 - Assumed care of patient at this time. IV from previous visit removed from system, pt did not arrived with IV line.    8217 - Patient comfortable with epidural. Denies needs at this time. Instructed to call out with constant rectal pressure or the urge to push.     0726 - Prolonged deceleration noted. Pt turned left lateral, placed in trendelenberg, IV fluid bolus started, oxygen applied. Dr. Marilyn Hannah called to room. SVE = 9 (anterior lip)/100/0. Will continue to monitor and labor down. Patient instructed to call out with constant rectal pressure or the urge to push. 4145 - New LR bag hung, Fluid bolus DC's, oxygen removed. Educated patient on pressure, station of baby and laboring down. 0368 - Patient reporting increased rectal pressure, not constant. Patient also reporting nausea. Will update Dr. Rajan Dias on patient status.

## 2017-01-15 NOTE — ANESTHESIA PROCEDURE NOTES
Epidural Block    Start time: 1/15/2017 3:09 AM  End time: 1/15/2017 3:20 AM  Performed by: Sejal Lopez  Authorized by: Sejal Lopez     Pre-Procedure  Indication: at surgeon's request and labor epidural    Preanesthetic Checklist: patient identified, risks and benefits discussed, anesthesia consent, patient being monitored, timeout performed and anesthesia consent      Epidural:   Patient position:  Seated  Prep region:  Lumbar  Prep: Betadine    Location:  L2-3    Needle and Epidural Catheter:   Needle Type:  Tuohy  Needle Gauge:  17 G  Injection Technique:  Loss of resistance using air  Attempts:  1  Catheter Size:  18 G  Events: no paresthesia and negative aspiration test    Test Dose:  Lidocaine 1.5% w/ epi and negative    Assessment:   Catheter Secured:  Tape  Insertion:  Uncomplicated  Patient tolerance:  Patient tolerated the procedure well with no immediate complications

## 2017-01-15 NOTE — IP AVS SNAPSHOT
Current Discharge Medication List  
  
Take these medications as needed Dose & Instructions Dispensing Information Comments Morning Noon Evening Bedtime  
 ibuprofen 600 mg tablet Commonly known as:  MOTRIN Your next dose is: Today, Tomorrow Other:  ____________ Dose:  600 mg Take 1 Tab by mouth every six (6) hours as needed for Pain. Take with food. Quantity:  30 Tab Refills:  0 Take these medications as directed Dose & Instructions Dispensing Information Comments Morning Noon Evening Bedtime  
 oxyCODONE-acetaminophen 5-325 mg per tablet Commonly known as:  PERCOCET Your next dose is: Today, Tomorrow Other:  ____________  
   
   
 1-2 Tablets every 4-6 hours as needed Quantity:  12 Tab Refills:  0 PRENATAL VITAMIN PO Your next dose is: Today, Tomorrow Other:  ____________ Take  by mouth. Refills:  0 ASK your doctor about these medications Dose & Instructions Dispensing Information Comments Morning Noon Evening Bedtime FISH OIL PO Your next dose is: Today, Tomorrow Other:  ____________ Take  by mouth. Refills:  0  
     
   
   
   
  
 ondansetron 4 mg disintegrating tablet Commonly known as:  ZOFRAN ODT Your next dose is: Today, Tomorrow Other:  ____________ Dose:  4 mg Take 1 Tab by mouth every eight (8) hours as needed for Nausea. Quantity:  12 Tab Refills:  0 PROBIOTIC PO Your next dose is: Today, Tomorrow Other:  ____________ Take  by mouth. Refills:  0  
     
   
   
   
  
 ZANTAC 75 75 mg tablet Generic drug:  raNITIdine Your next dose is: Today, Tomorrow Other:  ____________ Dose:  75 mg Take 75 mg by mouth two (2) times a day. Refills:  0 Where to Get Your Medications These medications were sent to [facility], [address] [facility],[address]  [address] Phone:  [phone]  
  ibuprofen 600 mg tablet Information about where to get these medications is not yet available ! Ask your nurse or doctor about these medications  
  oxyCODONE-acetaminophen 5-325 mg per tablet

## 2017-01-15 NOTE — ROUTINE PROCESS
Bedside and Verbal shift change report given to Divine Adkins RN (oncoming nurse) by Patricia Munoz RN (offgoing nurse). Report included the following information SBAR, Kardex, Procedure Summary, Intake/Output, MAR, Accordion and Recent Results.

## 2017-01-15 NOTE — PROGRESS NOTES
1122  Bedside and Verbal shift change report given to rebel culp (oncoming nurse) by [place]. Fang Pierce RN (offgoing nurse). Report included the following information SBAR, Kardex and MAR   56  Pt attempting to breastfeed with assistance from the nurse. [email or web address]   1100  Mother of the pt at the bedside helping her daughter with breastfeeding  26  Pt resting comfortably. Epidural cath removed. Talita pad changed  1:06 PM Pt sitting up in the bed eating her lunch  1330  Pt up OOB to the BR. Voided 800cc of clear yellow urine with lochia mixed in. Went over talita care with the patient. Went over tucks pad use and dermablast with the patient. Pt back to bed ambulated with steady gate. Pt informed that she needs to call this nurse one more time if she has to urinate again. Medicated with Motrin 800mg po for lower abd cramping. 2:40 PM RUBIA Osorio RN at the bedside to go over lactation with the pt.  3:57 PM Pt transferred to [facility] all belongings with the pt.   Report to Yoana Brewster RN

## 2017-01-15 NOTE — ROUTINE PROCESS
SBAR IN Report: Mother    Verbal report received from Sharon Moreland RN (full name & credentials) on this patient, who is now being transferred from L&D (unit) for routine progression of care. Report consisted of patient's Situation, Background, Assessment and Recommendations (SBAR). [place] ID bands were compared with the identification form, and verified with the patient and transferring nurse. Information from the SBAR, Kardex, Procedure Summary, Intake/Output, MAR, Accordion and Recent Results and the [place] Report was reviewed with the transferring nurse; opportunity for questions and clarification provided.

## 2017-01-15 NOTE — H&P
History & Physical    Name: Lance Hooper MRN: [id number]  SSN: [id number]    YOB: 1985  Age: 28 y.o. Sex: female        Subjective:     Estimated Date of Delivery: 17  OB History      Para Term  AB TAB SAB Ectopic Multiple Living    1 0 0 0 0 0 0 0 0 0          Ms. Josue Reagan is admitted with pregnancy at 40w2d for evaluation of abdominal pain. She states +uterine contractions every 5 mins for over 4hrs; , no VB, no LOF, +FM. Candance Huger Prenatal course was normal. Please see prenatal records for details. Past Medical History   Diagnosis Date    Asthma     Fibrocystic breast disease 3/2012    Pap smear for cervical cancer screening 2016     negative    PCOS (polycystic ovarian syndrome)     Polycystic disease, ovaries      History reviewed. No pertinent past surgical history. Social History     Occupational History    Not on file. Social History Main Topics    Smoking status: Never Smoker    Smokeless tobacco: Never Used    Alcohol use No    Drug use: No    Sexual activity: Yes     Partners: Male     Birth control/ protection: None     Family History   Problem Relation Age of Onset    Other Sister      PCOS    Thyroid Disease Sister      hypothyroid, prolactinoma    Breast Cancer Paternal Aunt     Colon Cancer Maternal Grandfather     Cancer Paternal Grandmother      melanoma    Diabetes Paternal Grandfather     Heart Disease Paternal Grandfather     Diabetes Father     No Known Problems Mother        No Known Allergies  Prior to Admission medications    Medication Sig Start Date End Date Taking? Authorizing Provider   raNITIdine (ZANTAC 75) 75 mg tablet Take 75 mg by mouth two (2) times a day. Yes Historical Provider   PRENATAL VIT W-CA,FE,FA,<1 MG, (PRENATAL VITAMIN PO) Take  by mouth. Yes Historical Provider   DOCOSAHEXANOIC ACID/EPA (FISH OIL PO) Take  by mouth.    Yes Historical Provider   LACTOBACILLUS ACIDOPHILUS (PROBIOTIC PO) Take  by mouth.   Yes Historical Provider   ondansetron (ZOFRAN ODT) 4 mg disintegrating tablet Take 1 Tab by mouth every eight (8) hours as needed for Nausea. 12/5/16   Rosita Chacon MD        A comprehensive review of systems was negative except for that written in the HPI. Objective:     Vitals:  Vitals:    01/15/17 0527 01/15/17 0532 01/15/17 0534 01/15/17 0537   BP:   116/62    Pulse:   75    Resp:       Temp:       SpO2: 95% 95%  95%   Weight:       Height:            Physical Exam  Gen:Uncomfortable  Pulm: CTA b/L  CV: S1S2 RRR  Abd: Gravid, soft and rigid  Pelvic: no VB  Cervical Exam:4cm dilated, 70%effaced, -3 fetal staion  Uterine Activity: every 3-4mins  Membranes: Intact  Fetal Heart Rate: Reactive     Prenatal Labs:   Lab Results   Component Value Date/Time    Rubella, External 8.34 06/01/2016    GrBStrep, External Negative 12/12/2016    HBsAg, External negative 06/01/2016    HIV, External non-reactive 06/01/2016    RPR, External non-reactive 06/01/2016    Gonorrhea, External negative 06/01/2016    Chlamydia, External negative 06/01/2016        Impression/Plan: 40.2weeks gestation, abdominal pain related to pregnancy     Plan: Active labor. Admit for labor. Group B Strep was negative.  CBC, Consult anesthesia for desired epidural.     Signed By:  Mayelin Peterson MD     January 15, 2017

## 2017-01-15 NOTE — IP AVS SNAPSHOT
Laura Posadas 
 
 
 [address] [facility] 
[phone] Patient: Radha Leal MRN: [id number] :1985 You are allergic to the following No active allergies Recent Documentation Height Weight Breastfeeding? BMI OB Status Smoking Status 1.651 m 89.8 kg Unknown 32.95 kg/m2 Recent pregnancy Never Smoker Unresulted Labs Order Current Status SAMPLE TO BLOOD BANK In process Emergency Contacts Name Discharge Info Relation Home Work Mobile [employer] CAREGIVER [3] Spouse [3] [phone] About your hospitalization You were admitted on:  January 15, 2017 You last received care in the:  [facility] 3 [facility]HER [facility] You were discharged on:  2017 Unit phone number:  [phone] Why you were hospitalized Your primary diagnosis was:  Not on File Your diagnoses also included:  Pregnancy Providers Seen During Your Hospitalizations Provider Role Specialty Primary office phone Matthew Bhat MD Attending Provider Obstetrics & Gynecology [phone] Your Primary Care Physician (PCP) Primary Care Physician Office Phone Office Fax Elfego Oliver [phone] Follow-up Information Follow up With Details Comments Contact Info Matthew Bhat MD In 6 weeks Postpartum visit [address] [place] [address] [employer] [address] [facility] 
[phone] Your Appointments  10:50 AM EST  
OB VISIT with Matthew Bhat MD  
[facility] [address] [facility] [address] 1007 [facility]  
[phone] Current Discharge Medication List  
  
START taking these medications Dose & Instructions Dispensing Information Comments Morning Noon Evening Bedtime  
 ibuprofen 600 mg tablet Commonly known as:  MOTRIN Your next dose is: Today, Tomorrow Other:  _________ Dose:  600 mg Take 1 Tab by mouth every six (6) hours as needed for Pain. Take with food. Quantity:  30 Tab Refills:  0  
     
   
   
   
  
 oxyCODONE-acetaminophen 5-325 mg per tablet Commonly known as:  PERCOCET Your next dose is: Today, Tomorrow Other:  _________  
   
   
 1-2 Tablets every 4-6 hours as needed Quantity:  12 Tab Refills:  0 CONTINUE these medications which have NOT CHANGED Dose & Instructions Dispensing Information Comments Morning Noon Evening Bedtime PRENATAL VITAMIN PO Your next dose is: Today, Tomorrow Other:  _________ Take  by mouth. Refills:  0 ASK your doctor about these medications Dose & Instructions Dispensing Information Comments Morning Noon Evening Bedtime FISH OIL PO Your next dose is: Today, Tomorrow Other:  _________ Take  by mouth. Refills:  0  
     
   
   
   
  
 ondansetron 4 mg disintegrating tablet Commonly known as:  ZOFRAN ODT Your next dose is: Today, Tomorrow Other:  _________ Dose:  4 mg Take 1 Tab by mouth every eight (8) hours as needed for Nausea. Quantity:  12 Tab Refills:  0 PROBIOTIC PO Your next dose is: Today, Tomorrow Other:  _________ Take  by mouth. Refills:  0  
     
   
   
   
  
 ZANTAC 75 75 mg tablet Generic drug:  raNITIdine Your next dose is: Today, Tomorrow Other:  _________ Dose:  75 mg Take 75 mg by mouth two (2) times a day. Refills:  0 Where to Get Your Medications These medications were sent to [facility], [address] [place] [address],[address]  [address] [facility] Pushpa Sauer 86 Phone:  [phone] ibuprofen 600 mg tablet Information on where to get these meds will be given to you by the nurse or doctor. ! Ask your nurse or doctor about these medications  
  oxyCODONE-acetaminophen 5-325 mg per tablet Discharge Instructions [address] [place] [address] OB-GYN 
[email or web address] 
[phone] Myrla Runner, MD, FACOG  
 
POST DELIVERY DISCHARGE INSTRUCTIONS 
FROM YOUR PHYSICIAN Name: [place] Payer YOB: 1985 General:  
 
Read all discharge information provided by the hospital 
 
Diet/Diet Restrictions: 
Eat healthy meals and snacks as desired. Eat foods that are high in fiber and low in fat and cholesterol. Drink eight 8-ounce glasses of water daily; avoid excessive caffeine intake. http://www.[email or web address].org/. html 
EliteClients.[email or web address] Medications:  
See discharge medication list and read instructions carefully. Breast Feeding: 
See instructions from your lactation consult. Call [phone] for more information or to locate a lactation consultant. https://www.petty.info/ Vaccines: If you received the MMR vaccine postpartum you should wait three months until you get pregnant again. You, and close contacts, should make sure that the Tdap vaccine is up to date. This vaccine can decrease the risk of your baby getting pertussis or \"whooping cough. \" You, and close contacts, should receive the influenza vaccine during flu season when appropriate. SalaryStart.tn Tobacco Use: If you (or other people around the baby) smoke or use tobacco products, please try to  use and quit to improve your health and decrease risk to your baby. LimitBuy.nl. htm Swelling in your Legs: 
There are many fluid changes after delivery and you may have more swelling the first few days after delivery  Continue to drink plenty of water, avoid sitting or standing in one position for too long and elevate your feet above your heart, to help reduce some the pressure you may be feeling in your ankles and legs.  Section Incision: 
Steri-strips or tape strips may be removed gently at home approximately 7- 10 days after surgery. Soaking the strips with a warm, wet cloth or taking a shower may make the strips easier to remove. Metal staples are usually removed within 3 to 10 days, either before you leave the hospital or in the office. Make an appointment if needed. Insorb absorbable staples may be used under the skin but you may see small white pieces as they dissolve. Skin glue or dermabond will fall off with time. Abdominal incisions should be kept clean by showering. It is not necessary to put soap on the incision; plain tap water is adequate. Avoid scrubbing the area and pat dry. The way your scar looks will change over time and may not reach its final appearance for up to a year. The area may feel either numb or sensitive to touch, which is normal. 
 
    
Physical Activity / Restrictions / Safety:  
 
Avoid heavy lifting, no more that 10 pounds, for 2-3 weeks. No driving while taking narcotic pain medication, of if you can not slam on the brakes. No intercourse for 4-6 weeks, no douching or tampon use until seen by your doctor for your postpartum visit. Use condoms as needed for contraception with sexual activity. You may resume normal exercise after you are cleared by your physician at your postpartum check. You may walk for exercise, as tolerated. Discharge Instructions/Special Treatment/Home Care Needs:  
 
Continue your prenatal vitamins while breast feeding or pumping. Continue to use a squirt bottle with warm water on your perineum/bottom/episiotomy after each bathroom use until bleeding stops. Take stool softeners daily. For example, docusate over the counter stool softener. This is especially important if you are taking narcotic pain medications, because they can cause constipation. Call your doctor for the following: If you have a fever over 100.4 degrees by mouth on two readings. If you have persistent vaginal bleeding heavier than a heavy menstrual period or persistent large clots or if you are bleeding so heavy it is making you feel weak. It is normal to pass larger clots when you first get out of bed: but if they persist, notify your physician. If you have red streaks or increased swelling of legs, painful red streaks on your breast. 
If you have painful urination, or increased pain, redness or discharge with your incision. If you have any questions or concerns. Pain Management:  
 
Take Acetaminophen (Tylenol), Ibuprofen (Advil, Motrin), prescribed pain medications as directed for pain. Do not take Perocet with Tylenol, they both contain acetaminophen. Use a warm water Sitz bath 3 times daily to relieve episiotomy, bottom/perineum or hemorrhoidal discomfort. Apply heating pad to  incision as needed. For hemorrhoidal discomfort, you can use Tucks and Anusol cream as needed and directed. NSAID information for patients: 
Ruby.dirk Pain medication/narcotic information for patients:  Take your medicine exactly as prescribed  Store your medicine away from children and in a safe 
place  Do not give your medicine to others  Do not drink alcohol while taking this medicine Follow-Up Care:  
 
Appointment with MD: 
Dr. Micheal Ramos [phone] Schedule your postpartum visit for six weeks Additional Discharge Instructions Please read all of your discharge instructions Follow all of your medication instructions carefully Call our office on the next business day to schedule your follow-up appointment If you have any questions or concerns, please contact us at [phone] or if the situation is urgent contact 9-1-1 Become a Viri Jerez My Chart user so you can access information, results and appointments: go to [email or web address]. The [employer] is to bring compassion to healthcare and to be good help to those in need. We aim at providing quality healthcare with an emphasis on respect, justice, compassion, stewardship, integrity, growth and innovation. If you did not receive excellent communication, compassionate care and an outstanding patient experience, please notify Loreli Najjar at [email or web address] or [phone] or discuss your concerns with me at your next visit so that we can meet our mission and your expectations Lori Julian MD 
[place] [address], [address] 
[email or web address]  
[phone] Good Help to Those in Need® Discharge Orders None [employer] Announcement We are excited to announce that we are making your provider's discharge notes available to you in [employer]. You will see these notes when they are completed and signed by the physician that discharged you from your recent hospital stay. If you have any questions or concerns about any information you see in [employer], please call the Health Information Department where you were seen or reach out to your Primary Care Provider for more information about your plan of care. Introducing [facility]! Dear Halie Magaña: 
Thank you for requesting a Gabstr account. Our records indicate that you already have an active Gabstr account. You can access your account anytime at [email or web address] Did you know that you can access your hospital and ER discharge instructions at any time in Lifefactory? You can also review all of your test results from your hospital stay or ER visit. Additional Information If you have questions, please visit the Frequently Asked Questions section of the Lif[employer]actory website at [email or web address] Remember, Zaaskt is NOT to be used for urgent needs. For medical emergencies, dial 911. Now available from your iPhone and Android! General Information Please provide this summary of care documentation to your next provider. Patient Signature:  ____________________________________________________________ Date:  ____________________________________________________________  
  
[facility] Provider Signature:  ____________________________________________________________ Date:  ____________________________________________________________

## 2017-01-16 PROCEDURE — 74011250637 HC RX REV CODE- 250/637: Performed by: OBSTETRICS & GYNECOLOGY

## 2017-01-16 PROCEDURE — 77030021125

## 2017-01-16 PROCEDURE — 65270000029 HC RM PRIVATE

## 2017-01-16 RX ORDER — DOCUSATE SODIUM 100 MG/1
100 CAPSULE, LIQUID FILLED ORAL DAILY
Status: DISCONTINUED | OUTPATIENT
Start: 2017-01-16 | End: 2017-01-16

## 2017-01-16 RX ORDER — DOCUSATE SODIUM 100 MG/1
100 CAPSULE, LIQUID FILLED ORAL DAILY
Status: DISCONTINUED | OUTPATIENT
Start: 2017-01-16 | End: 2017-01-17 | Stop reason: HOSPADM

## 2017-01-16 RX ORDER — IBUPROFEN 600 MG/1
600 TABLET ORAL
Qty: 30 TAB | Refills: 0 | Status: SHIPPED | OUTPATIENT
Start: 2017-01-16 | End: 2017-03-01

## 2017-01-16 RX ORDER — OXYCODONE AND ACETAMINOPHEN 5; 325 MG/1; MG/1
TABLET ORAL
Qty: 12 TAB | Refills: 0 | Status: SHIPPED | OUTPATIENT
Start: 2017-01-16 | End: 2017-03-01

## 2017-01-16 RX ADMIN — IBUPROFEN 600 MG: 800 TABLET, FILM COATED ORAL at 16:30

## 2017-01-16 RX ADMIN — IBUPROFEN 600 MG: 800 TABLET, FILM COATED ORAL at 10:35

## 2017-01-16 RX ADMIN — IBUPROFEN 600 MG: 800 TABLET, FILM COATED ORAL at 23:05

## 2017-01-16 RX ADMIN — DOCUSATE SODIUM 100 MG: 100 CAPSULE ORAL at 10:35

## 2017-01-16 RX ADMIN — DOCUSATE SODIUM 100 MG: 100 CAPSULE ORAL at 04:07

## 2017-01-16 RX ADMIN — IBUPROFEN 600 MG: 800 TABLET, FILM COATED ORAL at 04:06

## 2017-01-16 NOTE — PROGRESS NOTES
Bedside shift change report given to [facility] (oncoming nurse) by Tamela Batista (offgoing nurse). Report included the following information SBAR and MAR.

## 2017-01-16 NOTE — ACP (ADVANCE CARE PLANNING)
Request by patient, through in basket order, to assist with advance medical directive. Consulted with patients nurse. Explained document to patient and , who were present in the room. Pt and spouse would like to review documentation and complete at a later time. Rev.  [id number] Rodger English M.Div, M.S, [address] available at [phone])

## 2017-01-16 NOTE — DISCHARGE SUMMARY
Obstetrical Discharge Summary     Name: Germain Goss MRN: [id number]  SSN: [id number]    YOB: 1985  Age: 28 y.o. Sex: female      Admit Date: 1/15/2017    Discharge Date: 2017     Admitting Physician: Sofya Celaya MD     Attending Physician:  Bulmaro Alex MD     Admission Diagnoses: Pregnancy    Condition on Discharge: Stable    Procedures:     Disposition: to home    Discharge Diagnoses:   Information for the patient's :  Ludwin Celaya, Male [[id number]]   Delivery of a 8 lb 1.3 oz (3.665 kg) male infant via Vaginal, Spontaneous Delivery on 1/15/2017 at 9:26 AM  by . Apgars were 9 and 9. Additional Diagnoses:   Hospital Problems  Date Reviewed: 2017          Codes Class Noted POA    Pregnancy ICD-10-CM: Z33.1  ICD-9-CM: V22.2  1/15/2017 Unknown             Lab Results   Component Value Date/Time    Rubella, External 8.34 2016    GrBStrep, External Negative 2016       Hospital Course: Normal hospital course following the delivery. Patient Instructions:   Current Discharge Medication List      START taking these medications    Details   ibuprofen (MOTRIN) 600 mg tablet Take 1 Tab by mouth every six (6) hours as needed for Pain. Take with food. Qty: 30 Tab, Refills: 0      oxyCODONE-acetaminophen (PERCOCET) 5-325 mg per tablet 1-2 Tablets every 4-6 hours as needed  Qty: 12 Tab, Refills: 0         CONTINUE these medications which have NOT CHANGED    Details   raNITIdine (ZANTAC 75) 75 mg tablet Take 75 mg by mouth two (2) times a day. PRENATAL VIT W-CA,FE,FA,<1 MG, (PRENATAL VITAMIN PO) Take  by mouth. DOCOSAHEXANOIC ACID/EPA (FISH OIL PO) Take  by mouth. LACTOBACILLUS ACIDOPHILUS (PROBIOTIC PO) Take  by mouth. ondansetron (ZOFRAN ODT) 4 mg disintegrating tablet Take 1 Tab by mouth every eight (8) hours as needed for Nausea. Qty: 12 Tab, Refills: 0             Reference my discharge instructions.     Follow-up Appointments Procedures    FOLLOW UP VISIT Appointment in: 6 Weeks     Standing Status:   Standing     Number of Occurrences:   1     Order Specific Question:   Appointment in     Answer:   6 Weeks        Signed By:  Mar Doherty MD     January 16, 2017

## 2017-01-16 NOTE — LACTATION NOTE
Reviewed breastfeeding basics:  How milk is made and normal  breastfeeding behaviors discussed. Supply and demand,  stomach size, early feeding cues, skin to skin bonding with comfortable positioning and baby led latch-on reviewed. How to identify signs of successful breastfeeding sessions reviewed; education on assymetrical latch, signs of effective latching vs shallow, in-effective latching, normal  feeding frequency and duration and expected infant output discussed. Normal course of breastfeeding discussed including the AAP's recommendation that children receive exclusive breast milk feedings for the first six months of life with breast milk feedings to continue through the first year of life and/or beyond as complimentary table foods are added. Breastfeeding Booklet and Warm line information provided with discussion. Discussed typical  weight loss and the importance of pediatrician appointment within 24-48 hours of discharge, at 2 weeks of life and normalcy of requesting pediatric weight checks as needed in between visits. Pt will successfully establish breastfeeding by feeding in response to early feeding cues   or wake every 3h, will obtain deep latch, and will keep log of feedings/output. Taught to BF at hunger cues and or q 2-3 hrs and to offer 10-20 drops of hand expressed colostrum at any non-feeds.       Breast Assessment  Left Breast: Medium, Large  Left Nipple: Everted, Short, Intact  Right Breast: Medium, Large  Right Nipple: Everted, Short, Intact (Nipples lorena w/stim; mom states she is currently wearing shield at feeds)  Breast- Feeding Assessment  Attends Breast-Feeding Classes:  (BF basics, how milk is made + normal  BF behaviors reviewed)  Breast-Feeding Experience: No  Breast Trauma/Surgery: No  Type/Quality: Good (Aware to call for LC at next feeding)  Lactation Consultant Visits  Breast-Feedings: Good   Mother/Infant Observation  Mother Observation: Breast comfortable, Recognizes feeding cues  Infant Observation: Feeding cues, Rhythmic suck  LATCH Documentation  Latch: Grasps breast, tongue down, lips flanged, rhythmic sucking (As per mom, to call LC at next feeding session)  Audible Swallowing: A few with stimulation  Type of Nipple: Everted (after stimulation)  Comfort (Breast/Nipple): Soft/non-tender  Hold (Positioning): Full assist, teach one side, mother does other, staff holds  [facility] Score: 8  Biological Nurturing breastfeeding principles taught. How Biological Nurturing (BN)  promotes optimal breastfeeding (BF) sessions discussed. Mother encouraged to seek comfortable semi-reclining breastfeeding positions. Infant placed frontally along maternal contour. Primitive innate feeding reflexes/behaviors of the  discussed. BN tips and techniques shared; assisted with comfortable breastfeeding positioning.

## 2017-01-16 NOTE — PROGRESS NOTES
Spiritual Care Assessment/Progress Notes    Patricia Nicole [id number]  [id number]    1985  28 y.o.  female    Patient Telephone Number: [phone] (home)   Yazidism Affiliation: Adventist   Language: English   Extended Emergency Contact Information  Primary Emergency Contact: [address] Phone: [phone]  Mobile Phone: [phone]  Relation: Spouse   Patient Active Problem List    Diagnosis Date Noted    Pregnancy 01/15/2017    Supervision of normal first pregnancy 06/01/2016    Polycystic ovaries 05/02/2016        Date: 1/16/2017       Level of Yazidism/Spiritual Activity:  [x]         Involved in tate tradition/spiritual practice    []         Not involved in tate tradition/spiritual practice  [x]         Spiritually oriented    []         Claims no spiritual orientation    []         seeking spiritual identity  []         Feels alienated from Latter-day practice/tradition  []         Feels angry about Latter-day practice/tradition  [x]         Spirituality/Latter-day tradition is a resource for coping at this time.   []         Not able to assess due to medical condition    Services Provided Today:  []         crisis intervention    []         reading Scriptures  []         spiritual assessment    []         prayer  []         empathic listening/emotional support  []         rites and rituals (cite in comments)  []         life review     []         Latter-day support  []         theological development   []         advocacy  []         ethical dialog     []         blessing  []         bereavement support    []         support to family  []         anticipatory grief support   [x]         help with AMD  []         spiritual guidance    []         meditation      Spiritual Care Needs  []         Emotional Support  []         Spiritual/Yazidism Care  []         Loss/Adjustment  []         Advocacy/Referral                /Ethics  [x]         No needs expressed at               this time  []         Other: (note in               comments)  Spiritual Care Plan  []         Follow up visits with               pt/family  []         Provide materials  []         Schedule sacraments  []         Contact Community               Clergy  [x]         Follow up as needed  []         Other: (note in               comments)     Comments: Request by patient, through in basket order, to assist with advance medical directive. Consulted with patients nurse. Explained document to patient and , who were present in the room. Pt and spouse would like to review documentation and complete at a later time. Rev.  6901 MARIA LUZ HoffmannDiv, M.S, [address] available at [phone])

## 2017-01-16 NOTE — ROUTINE PROCESS
Bedside shift change report given to SENTHIL Vazquez RN (oncoming nurse) by Teodoro Fallon RN (offgoing nurse). Report included the following information SBAR, Kardex, Intake/Output and MAR.

## 2017-01-16 NOTE — PROGRESS NOTES
Post-Partum Day Number 1 Progress Note    Ling Ramus       Information for the patient's :  Koffi Jara, Male [[id number]]   Vaginal, Spontaneous Delivery   Patient doing well without significant complaint. Voiding without difficulty, normal lochia. Vitals:  Visit Vitals    /61 (BP Patient Position: At rest)    Pulse 67    Temp 97.7 °F (36.5 °C)    Resp 16    Ht 5' 5\" (1.651 m)    Wt 198 lb (89.8 kg)    LMP 2016 (Approximate)    SpO2 96%    Breastfeeding Unknown    BMI 32.95 kg/m2     Temp (24hrs), Av.9 °F (36.6 °C), Min:97.7 °F (36.5 °C), Max:98 °F (36.7 °C)        Exam:   Patient without distress. Abdomen soft, fundus firm, nontender                Perineum with normal lochia noted. Lower extremities are negative for swelling, cords or tenderness. Labs:     Lab Results   Component Value Date/Time    WBC 11.8 01/15/2017 01:57 AM    WBC 11.9 10/17/2016 04:24 PM    WBC 11.0 2016 04:41 PM    WBC 8.2 2016 05:13 PM    HGB 12.2 01/15/2017 01:57 AM    HGB 11.8 10/17/2016 04:24 PM    HGB 12.8 2016 04:41 PM    HGB 13.4 2016 05:13 PM    HCT 37.3 01/15/2017 01:57 AM    HCT 34.4 10/17/2016 04:24 PM    HCT 38.9 2016 04:41 PM    HCT 39.6 2016 05:13 PM    PLATELET 362 15/72/7051 01:57 AM    PLATELET 238  04:24 PM    PLATELET 286  04:41 PM    PLATELET 986  05:13 PM    Hgb, External 12.8 2016    Hct, External 38.9 2016    Platelet cnt., External 263 2016       No results found for this or any previous visit (from the past 24 hour(s)). Assessment: Doing well, post partum day 1    Plan:  1. Continue routine postpartum and perineal care as well as maternal education.

## 2017-01-17 VITALS
HEART RATE: 69 BPM | TEMPERATURE: 97.8 F | SYSTOLIC BLOOD PRESSURE: 112 MMHG | OXYGEN SATURATION: 96 % | HEIGHT: 65 IN | WEIGHT: 198 LBS | DIASTOLIC BLOOD PRESSURE: 69 MMHG | BODY MASS INDEX: 32.99 KG/M2 | RESPIRATION RATE: 16 BRPM

## 2017-01-17 PROCEDURE — 74011250637 HC RX REV CODE- 250/637: Performed by: OBSTETRICS & GYNECOLOGY

## 2017-01-17 RX ADMIN — IBUPROFEN 600 MG: 800 TABLET, FILM COATED ORAL at 05:54

## 2017-01-17 RX ADMIN — DOCUSATE SODIUM 100 MG: 100 CAPSULE ORAL at 08:14

## 2017-01-17 RX ADMIN — MUPIROCIN: 20 OINTMENT TOPICAL at 08:14

## 2017-01-17 NOTE — ROUTINE PROCESS
Patient off unit in stable condition via wheelchair with volunteer for discharge home per Dr. Hipolito Argueta. Pt is to follow-up in 6 weeks and is aware. Prescriptions given to patient. Patient denies any HA, Dizziness, N/V or pain at this time. Infant placed in car seat by mother. Discharge teaching completed and discharge instructions signed and given to patients without any further questions at this time.

## 2017-01-17 NOTE — PROGRESS NOTES
Post-Partum Progress Note    Patient doing well post-partum without significant complaint. She is voiding without difficulty, she reports normal lochia. Her pain is well controlled with oral pain medication. She is tolerating a general diet. Delivery information:  Information for the patient's :  Doni Briscoe, Male [[id number]]   Delivery of a 8 lb 1.3 oz (3.665 kg) male infant via Vaginal, Spontaneous Delivery on 1/15/2017 at 9:26 AM  by . Apgars were 9 and 9. Vitals:  Patient Vitals for the past 8 hrs:   BP Temp Pulse Resp   17 0554 112/69 97.8 °F (36.6 °C) 69 16     Temp (24hrs), Av °F (36.7 °C), Min:97.8 °F (36.6 °C), Max:98.5 °F (36.9 °C)    Visit Vitals    /69 (BP 1 Location: Left arm, BP Patient Position: At rest)    Pulse 69    Temp 97.8 °F (36.6 °C)    Resp 16    Ht 5' 5\" (1.651 m)    Wt 198 lb (89.8 kg)    SpO2 96%    Breastfeeding Unknown    BMI 32.95 kg/m2       Exam:    General: Patient without distress. Abdomen: soft, fundus firm at level of umbilicus, nontender  Lower extremities: negative for cords or tenderness. Labs: No results found for this or any previous visit (from the past 24 hour(s)). Assessment:    1. Postpartum S/P spontaneous vaginal delivery   2. Patient doing well without significant complications    Plan:  1. Continue routine postpartum care  2. Routine perineal care and maternal education   3.  Oral pain medications and bowel regimen as needed                China Monterroso MD

## 2017-01-17 NOTE — PROGRESS NOTES
Problem: Lactation Care Plan  Goal: *Infant latching appropriately  Outcome: Resolved/Met Date Met:  17  Pt will successfully establish breastfeeding by feeding in response to infant's early feeding cues and/or to offer breast every 2-3 hours. Ways to obtain a deep latch and seek comfortable positioning shared, aware to keep log of feedings/output. Goal: *Weight loss less than 10% of birth weight  Outcome: Resolved/Met Date Met:  17  Current infant weight loss is -5.6%. Baby to see pediatrician in 2 days. Reviewed breastfeeding basics:  Supply and demand,  stomach size, early  Feeding cues, skin to skin, positioning and baby led latch-on, assymetrical latch with signs of good, deep latch vs shallow, feeding frequency and duration, and log sheet for tracking infant feedings and output. Breastfeeding Booklet and Warm line information given. Discussed typical  weight loss and the importance of infant weight checks with pediatrician 1-2 post discharge. Problem: Patient Education: Go to Patient Education Activity  Goal: Patient/Family Education  Outcome: Resolved/Met Date Met:  17  Engorgement Care Guidelines:  Reviewed how milk is made and normal phases of milk production. Taught care of engorged breasts - frequent breastfeeding encouraged, cool packs and motrin as tolerated. Anticipatory guidance shared. Discussed eating a healthy diet. Instructed mother to eat a variety of foods in order to get a well balanced diet. She should consume an extra 500 calories per day (more than her non-pregnant requirement.) These extra calories will help provide energy needed for optimal breast milk production. Mother also encouraged to \"drink to thirst\" and it is recommended that she drink fluids such as water, fruit/vegetable juice. Nutritious snacks should be available so that she can eat throughout the day to help satisfy her hunger and maintain a good milk supply.       Care for sore/tender nipples discussed:  ways to improve positioning and latch practiced and discussed, hand express colostrum after feedings and let air dry, light application of lanolin, hydrogel pads, seek comfortable laid back feeding position, start feedings on least sore side first.     Shield use recommended due to everted but short nipples. use of shield affords deeper more comfortable latching with sustained rhythmic suckling and intermittent swallowing noted. Proper care, application and use of shield discussed; anticipatory guidance shared. Comments:   Pt will successfully establish breastfeeding by feeding in response to early feeding cues   or wake every 3h, will obtain deep latch, and will keep log of feedings/output. Taught to BF at hunger cues and or q 2-3 hrs and to offer 10-20 drops of hand expressed colostrum at any non-feeds. Breast Assessment  Left Breast: Medium, Large  Left Nipple: Everted, Short, Intact  Right Breast: Medium, Large  Right Nipple: Everted, Short, Intact  Breast- Feeding Assessment  Attends Breast-Feeding Classes: No  Breast-Feeding Experience: No  Breast Trauma/Surgery: No  Type/Quality: Good (Mother states she cluster fed baby last night)  Lactation Consultant Visits  Breast-Feedings: Good  (Using nipple shield with feedings. Mother is pumping after feedings until her milk comes in to increase her mlk supply. .Discussed using shield as a teaching tool to get baby to brreast and to try and wean off shield as feedings progress.)  Mother/Infant Observation  Mother Observation: Alignment, Breast comfortable, Close hold, Holds breast, Recognizes feeding cues  Infant Observation: Audible swallows, Latches nipple and aereolae, Rhythmic suck, Opens mouth, Lips flanged, upper, Lips flanged, lower  LATCH Documentation  Latch: Grasps breast, tongue down, lips flanged, rhythmic sucking  Audible Swallowing: A few with stimulation  Type of Nipple: Everted (after stimulation)  Comfort (Breast/Nipple): Soft/non-tender  Hold (Positioning): No assist from staff, mother able to position/hold infant  LATCH Score: 9

## 2017-01-17 NOTE — DISCHARGE INSTRUCTIONS
[facility]  [email or web address]  [phone]    August Osgood, MD, FACOG     POST DELIVERY DISCHARGE INSTRUCTIONS  FROM YOUR PHYSICIAN    Name: Kamilah Ricardo  YOB: 1985    General:     Read all discharge information provided by the hospital    Diet/Diet Restrictions:  Eat healthy meals and snacks as desired. Eat foods that are high in fiber and low in fat and cholesterol. Drink eight 8-ounce glasses of water daily; avoid excessive caffeine intake. [email or web address] html  [email or web address].be    Medications:   See discharge medication list and read instructions carefully. Breast Feeding:  See instructions from your lactation consult. Call [phone] for more information or to locate a lactation consultant. [email or web address].petty.[email or web address]    Vaccines:  If you received the MMR vaccine postpartum you should wait three months until you get pregnant again. You, and close contacts, should make sure that the Tdap vaccine is up to date. This vaccine can decrease the risk of your baby getting pertussis or \"whooping cough. \"    You, and close contacts, should receive the influenza vaccine during flu season when appropriate. SalaryStart.tn    Tobacco Use: If you (or other people around the baby) smoke or use tobacco products, please try to  use and quit to improve your health and decrease risk to your baby. LimitBuy.nl. htm    Swelling in your Legs:  There are many fluid changes after delivery and you may have more swelling the first few days after delivery  Continue to drink plenty of water, avoid sitting or standing in one position for too long and elevate your feet above your heart, to help reduce some the pressure you may be feeling in your ankles and legs.      Section Incision:  Steri-strips or tape strips may be removed gently at home approximately 7- 10 days after surgery. Soaking the strips with a warm, wet cloth or taking a shower may make the strips easier to remove. Metal staples are usually removed within 3 to 10 days, either before you leave the hospital or in the office. Make an appointment if needed. Insorb absorbable staples may be used under the skin but you may see small white pieces as they dissolve. Skin glue or dermabond will fall off with time. Abdominal incisions should be kept clean by showering. It is not necessary to put soap on the incision; plain tap water is adequate. Avoid scrubbing the area and pat dry. The way your scar looks will change over time and may not reach its final appearance for up to a year. The area may feel either numb or sensitive to touch, which is normal.         Physical Activity / Restrictions / Safety:     Avoid heavy lifting, no more that 10 pounds, for 2-3 weeks. No driving while taking narcotic pain medication, of if you can not slam on the brakes. No intercourse for 4-6 weeks, no douching or tampon use until seen by your doctor for your postpartum visit. Use condoms as needed for contraception with sexual activity. You may resume normal exercise after you are cleared by your physician at your postpartum check. You may walk for exercise, as tolerated. Discharge Instructions/Special Treatment/Home Care Needs:     Continue your prenatal vitamins while breast feeding or pumping. Continue to use a squirt bottle with warm water on your perineum/bottom/episiotomy after each bathroom use until bleeding stops. Take stool softeners daily. For example, docusate over the counter stool softener. This is especially important if you are taking narcotic pain medications, because they can cause constipation. Call your doctor for the following:      If you have a fever over 100.4 degrees by mouth on two readings. If you have persistent vaginal bleeding heavier than a heavy menstrual period or persistent large clots or if you are bleeding so heavy it is making you feel weak. It is normal to pass larger clots when you first get out of bed: but if they persist, notify your physician. If you have red streaks or increased swelling of legs, painful red streaks on your breast.  If you have painful urination, or increased pain, redness or discharge with your incision. If you have any questions or concerns. Pain Management:     Take Acetaminophen (Tylenol), Ibuprofen (Advil, Motrin), prescribed pain medications as directed for pain. Do not take Perocet with Tylenol, they both contain acetaminophen. Use a warm water Sitz bath 3 times daily to relieve episiotomy, bottom/perineum or hemorrhoidal discomfort. Apply heating pad to  incision as needed. For hemorrhoidal discomfort, you can use Tucks and Anusol cream as needed and directed.     NSAID information for patients:  [email or web address]    Pain medication/narcotic information for patients:   Take your medicine exactly as prescribed   Store your medicine away from children and in a safe  place   Do not give your medicine to others   Do not drink alcohol while taking this medicine    Follow-Up Care:     Appointment with MD:  Dr. Donna Jay  [phone]  Schedule your postpartum visit for six weeks        Additional Discharge Instructions    Please read all of your discharge instructions  Follow all of your medication instructions carefully  Call our office on the next business day to schedule your follow-up appointment  If you have any questions or concerns, please contact us at [phone] or if the situation is urgent contact   Become a [facility] My Chart user so you can access information, results and appointments: go to [email or web address]. The [employer] is to bring compassion to healthcare and to be good help to those in need. We aim at providing quality healthcare with an emphasis on respect, justice, compassion, stewardship, integrity, growth and innovation.     If you did not receive excellent communication, compassionate care and an outstanding patient experience, please notify Sacha Huertas at [email or web address] or [phone] or discuss your concerns with me at your next visit so that we can meet our mission and your expectations    Adarsh Flowers MD  [address] [place] [address], [address]  [email or web address]   [phone]   Good Help to Those in [facility]

## 2017-01-17 NOTE — PROGRESS NOTES
Bedside and Verbal shift change report given to [facility] (oncoming nurse) by Alma Sanchez RN (offgoing nurse). Report included the following information SBAR, Kardex, Procedure Summary, Intake/Output, MAR and Recent Results.

## 2017-01-18 ENCOUNTER — TELEPHONE (OUTPATIENT)
Dept: OBGYN CLINIC | Age: 32
End: 2017-01-18

## 2017-01-18 ENCOUNTER — OFFICE VISIT (OUTPATIENT)
Dept: OBGYN CLINIC | Age: 32
End: 2017-01-18

## 2017-01-18 VITALS
DIASTOLIC BLOOD PRESSURE: 78 MMHG | TEMPERATURE: 98.5 F | WEIGHT: 187 LBS | HEIGHT: 65 IN | SYSTOLIC BLOOD PRESSURE: 116 MMHG | BODY MASS INDEX: 31.16 KG/M2

## 2017-01-18 DIAGNOSIS — R10.2 PERINEAL PAIN: Primary | ICD-10-CM

## 2017-01-18 DIAGNOSIS — R22.2 SWELLING OF PERINEAL TISSUE: ICD-10-CM

## 2017-01-18 NOTE — TELEPHONE ENCOUNTER
28year old patient delivered on 1/15/17 by vaginal delivery. Patient calling to report that she has a 2nd degree tear that was repaired. Patient reports the area is hard and swollen and she has a hematoma the size of a ping pong ball. Patient reports using ice , and doing sitz baths , motrin with out relief. Per Dr. Xiang Davalos patient advised to come to office now. Patient placed on the schedule for 3:50pm today. Patient verbalized understanding.

## 2017-01-18 NOTE — PROGRESS NOTES
[facility]  [email or web address]  [phone]    Eleuterio Reyes MD, FACOG       OB/GYN Problem visit    Chief Complaint:   Chief Complaint   Patient presents with    Post OP Complication     post vaginal delivery complication       History of Present Illness: This is a new problem being evaluated by this provider. The patient is a 28 y.o.  female who reports that she has had stinging and burning vaginal pain that has increased since delivery on . Patient reports that her mother looked at it and described it has a golf ball size hematoma around the stitches. Patient reports that ice and sitz bath's have not helped with the pain. Patient denies any fevers. She reports the symptoms are has worsened. Aggravating factors include movement. Alleviating factors include none. She does not have other concerns. LMP: No LMP recorded. PFSH:  Past Medical History   Diagnosis Date    Asthma     Fibrocystic breast disease 3/2012    Pap smear for cervical cancer screening 2016     negative    PCOS (polycystic ovarian syndrome)     Polycystic disease, ovaries      No past surgical history on file. Family History   Problem Relation Age of Onset    Other Sister      PCOS    Thyroid Disease Sister      hypothyroid, prolactinoma    Breast Cancer Paternal Aunt     Colon Cancer Maternal Grandfather     Cancer Paternal Grandmother      melanoma    Diabetes Paternal Grandfather     Heart Disease Paternal Grandfather     Diabetes Father     No Known Problems Mother      Social History   Substance Use Topics    Smoking status: Never Smoker    Smokeless tobacco: Never Used    Alcohol use No     No Known Allergies  Current Outpatient Prescriptions   Medication Sig    ibuprofen (MOTRIN) 600 mg tablet Take 1 Tab by mouth every six (6) hours as needed for Pain. Take with food.     oxyCODONE-acetaminophen (PERCOCET) 5-325 mg per tablet 1-2 Tablets every 4-6 hours as needed    PRENATAL VIT W-CA,FE,FA,<1 MG, (PRENATAL VITAMIN PO) Take  by mouth.  DOCOSAHEXANOIC ACID/EPA (FISH OIL PO) Take  by mouth.  LACTOBACILLUS ACIDOPHILUS (PROBIOTIC PO) Take  by mouth.  ondansetron (ZOFRAN ODT) 4 mg disintegrating tablet Take 1 Tab by mouth every eight (8) hours as needed for Nausea.  raNITIdine (ZANTAC 75) 75 mg tablet Take 75 mg by mouth two (2) times a day. No current facility-administered medications for this visit. Review of Systems:  History obtained from the patient  Constitutional: negative for fevers, chills and weight loss  ENT ROS: negative for - hearing change, oral lesions or visual changes  Respiratory: negative for cough, wheezing or dyspnea on exertion  Cardiovascular: negative for chest pain, irregular heart beats, exertional chest pressure/discomfort  Gastrointestinal: negative for dysphagia, nausea and vomiting  Genito-Urinary ROS:  see HPI  Inteument/breast: negative for rash, breast lump and nipple discharge  Musculoskeletal:negative for stiff joints, neck pain and muscle weakness  Endocrine ROS: negative for - breast changes, galactorrhea or temperature intolerance  Hematological and Lymphatic ROS: negative for - blood clots, bruising or swollen lymph nodes    Physical Exam:  Visit Vitals    /78    Temp 98.5 °F (36.9 °C)    Ht 5' 5\" (1.651 m)    Wt 187 lb (84.8 kg)    Breastfeeding Yes    BMI 31.12 kg/m2       GENERAL: alert, well appearing, and in no distress  HEAD: normocephalic, atraumatic. ABDOMEN: soft, nontender, nondistended, no masses or organomegaly   EGBUS: no lesions, no inflammation, no masses  VULVA: see image, perineal swelling, mildly tender  VAGINA: small amount of blood discharge at introitus  NEURO: alert, oriented, normal speech        Assessment:  Encounter Diagnoses   Name Primary?     Perineal pain Yes    Swelling of perineal tissue        Plan:  The patient is advised that she should contact the office if she does not note improvement or if symptoms recur  Recommend follow up with PCP for non-gynecologic complaints and chronic medical problems. She should contact our office with any questions or concerns  She could keep her routine annual exam appointment. Perineal care  Sitz baths/cold compress  Notify MD if NI  Infection precautions  No orders of the defined types were placed in this encounter. No results found for this visit on 01/18/17.

## 2017-01-18 NOTE — MR AVS SNAPSHOT
Visit Information Date & Time Provider Department Dept. Phone Encounter #  
 1/18/2017  3:50 PM MD Sander Martinez [id number] Your Appointments 1/19/2017 10:50 AM  
[facility] with MD [facility]ander [facility] Appt Note: 1 wk fob  
 [address] [facility] [address] [id number]  
[address] Upcoming Health Maintenance Date Due  
 PAP AKA CERVICAL CYTOLOGY 2/5/2017 Allergies as of 1/18/2017  Review Complete On: 1/18/2017 By: Shaka Vizcarra LPN No Known Allergies Current Immunizations  Never Reviewed Name Date Influenza Vaccine (Quad) PF 10/31/2016 Tdap 10/31/2016 Not reviewed this visit Vitals BP Height(growth percentile) Weight(growth percentile) Breastfeeding? BMI OB Status 116/78 5' 5\" (1.651 m) 187 lb (84.8 kg) Yes 31.12 kg/m2 Recent pregnancy Smoking Status Never Smoker BMI and BSA Data Body Mass Index Body Surface Area  
 31.12 kg/m 2 1.97 m 2 Preferred Pharmacy Pharmacy Name Phone [facility], [address] [facility] ([place], [place]) Your Updated Medication List  
  
   
This list is accurate as of: 1/18/17  4:35 PM.  Always use your most recent med list.  
  
  
  
  
 FISH OIL PO Take  by mouth. ibuprofen 600 mg tablet Commonly known as:  MOTRIN Take 1 Tab by mouth every six (6) hours as needed for Pain. Take with food. ondansetron 4 mg disintegrating tablet Commonly known as:  ZOFRAN ODT Take 1 Tab by mouth every eight (8) hours as needed for Nausea. oxyCODONE-acetaminophen 5-325 mg per tablet Commonly known as:  PERCOCET  
1-2 Tablets every 4-6 hours as needed PRENATAL VITAMIN PO Take  by mouth. PROBIOTIC PO Take  by mouth. ZANTAC 75 75 mg tablet Generic drug:  raNITIdine Take 75 mg by mouth two (2) times a day. Patient Instructions Vaginal Childbirth: Care Instructions Your Care Instructions Your body will slowly heal in the next few weeks. It is easy to get too tired and overwhelmed during the first weeks after your baby is born. Changes in your hormones can shift your mood without warning. You may find it hard to meet the extra demands on your energy and time. Take it easy on yourself. Follow-up care is a key part of your treatment and safety. Be sure to make and go to all appointments, and call your doctor if you are having problems. It's also a good idea to know your test results and keep a list of the medicines you take. How can you care for yourself at home? · Vaginal bleeding and cramps ¨ After delivery, you will have a bloody discharge from the vagina. This will turn pink within a week and then white or yellow after about 10 days. It may last for 2 to 4 weeks or longer, until the uterus has healed. Use pads instead of tampons until you stop bleeding. ¨ Do not worry if you pass some blood clots, as long as they are smaller than a golf ball. If you have a tear or stitches in your vaginal area, change the pad at least every 4 hours to prevent soreness and infection. ¨ You may have cramps for the first few days after childbirth. These are normal and occur as the uterus shrinks to normal size. Take an over-the-counter pain medicine, such as acetaminophen (Tylenol), ibuprofen (Advil, Motrin), or naproxen (Aleve), for cramps. Read and follow all instructions on the label. Do not take aspirin, because it can cause more bleeding. ¨ Do not take two or more pain medicines at the same time unless the doctor told you to. Many pain medicines have acetaminophen, which is Tylenol. Too much acetaminophen (Tylenol) can be harmful. · Stitches ¨ If you have stitches, they will dissolve on their own and do not need to be removed. Follow your doctor's instructions for cleaning the stitched area. ¨ Put ice or a cold pack on your painful area for 10 to 20 minutes at a time, several times a day, for the first few days. Put a thin cloth between the ice and your skin. ¨ Sit in a few inches of warm water (sitz bath) 3 times a day and after bowel movements. The warm water helps with pain and itching. If you do not have a tub, a warm shower might help. · Breast fullness ¨ Your breasts may overfill (engorge) in the first few days after delivery. To help milk flow and to relieve pain, warm your breasts in the shower or by using warm, moist towels before nursing. ¨ If you are not nursing, do not put warmth on your breasts or touch your breasts. Wear a tight bra or sports bra and use ice until the fullness goes away. This usually takes 2 to 3 days. ¨ Put ice or a cold pack on your breast after nursing to reduce swelling and pain. Put a thin cloth between the ice and your skin. · Activity ¨ Eat a balanced diet. Do not try to lose weight by cutting calories. Keep taking your prenatal vitamins, or take a multivitamin. ¨ Get as much rest as you can. Try to take naps when your baby sleeps during the day. ¨ Get some exercise every day. But do not do any heavy exercise until your doctor says it is okay. ¨ Wait until you are healed (about 4 to 6 weeks) before you have sexual intercourse. Your doctor will tell you when it is okay to have sex. ¨ Talk to your doctor about birth control. You can get pregnant even before your period returns. Also, you can get pregnant while you are breastfeeding. · Mental health ¨ It is normal to have some sadness, anxiety, sleeplessness, and mood swings after you go home. If you feel upset or hopeless for more than a few days or are having trouble doing the things you need to do, talk to your doctor. · Constipation and hemorrhoids ¨ Drink plenty of fluids, enough so that your urine is light yellow or clear like water. If you have kidney, heart, or liver disease and have to limit fluids, talk with your doctor before you increase the amount of fluids you drink. ¨ Eat plenty of fiber each day. Have a bran muffin or bran cereal for breakfast, and try eating a piece of fruit for a mid-afternoon snack. ¨ For painful, itchy hemorrhoids, put ice or a cold pack on the area several times a day for 10 minutes at a time. Follow this by putting a warm compress on the area for another 10 to 20 minutes or by sitting in a shallow, warm bath. When should you call for help? Call 911 anytime you think you may need emergency care. For example, call if: 
· You are thinking of hurting yourself, your baby, or anyone else. · You have sudden, severe pain in your belly. · You passed out (lost consciousness). Call your doctor now or seek immediate medical care if: 
· You have severe vaginal bleeding. · You are soaking through a pad each hour for 2 or more hours. · Your vaginal bleeding seems to be getting heavier or is still bright red 4 days after delivery. · You are dizzy or lightheaded, or you feel like you may faint. · You are vomiting or cannot keep fluids down. · You have a fever. · You have new or more belly pain. · You pass tissue (not just blood). · Your vaginal discharge smells bad. · Your belly feels tender or full and hard. · Your breasts are continuously painful or red. · You feel sad, anxious, or hopeless for more than a few days. Watch closely for changes in your health, and be sure to contact your doctor if you have any problems. Where can you learn more? Go to http://angélica-gulshan.info/. Enter V630 in the search box to learn more about \"Vaginal Childbirth: Care Instructions. \" Current as of: May 30, 2016 Content Version: 11.1 © 5073-0218 Xango.[employer], Weecast - Tuto.com.  Care instructions adapted under license by [employer] (which disclaims liability or warranty for this information). If you have questions about a medical condition or this instruction, always ask your healthcare professional. [address] any warranty or liability for your use of this information. Introducing [facility] & HEALTH SERVICES! Dear Jim Louis: 
Thank you for requesting a [employer] account. Our records indicate that you already have an active [employer] account. You can access your account anytime at [email or web address] Did you know that you can access your hospital and ER discharge instructions at any time in [employer]? You can also review all of your test results from your hospital stay or ER visit. Additional Information If you have questions, please visit the Frequently Asked Questions section of the [employer] website at [email or web address] Remember, [employer] is NOT to be used for urgent needs. For medical emergencies, dial 911. Now available from your iPhone and Android! Please provide this summary of care documentation to your next provider. Your primary care clinician is listed as FALLON ALFONSO. If you have any questions after today's visit, please call [phone].

## 2017-01-18 NOTE — PATIENT INSTRUCTIONS
Vaginal Childbirth: Care Instructions  Your Care Instructions  Your body will slowly heal in the next few weeks. It is easy to get too tired and overwhelmed during the first weeks after your baby is born. Changes in your hormones can shift your mood without warning. You may find it hard to meet the extra demands on your energy and time. Take it easy on yourself. Follow-up care is a key part of your treatment and safety. Be sure to make and go to all appointments, and call your doctor if you are having problems. It's also a good idea to know your test results and keep a list of the medicines you take. How can you care for yourself at home? · Vaginal bleeding and cramps  ¨ After delivery, you will have a bloody discharge from the vagina. This will turn pink within a week and then white or yellow after about 10 days. It may last for 2 to 4 weeks or longer, until the uterus has healed. Use pads instead of tampons until you stop bleeding. ¨ Do not worry if you pass some blood clots, as long as they are smaller than a golf ball. If you have a tear or stitches in your vaginal area, change the pad at least every 4 hours to prevent soreness and infection. ¨ You may have cramps for the first few days after childbirth. These are normal and occur as the uterus shrinks to normal size. Take an over-the-counter pain medicine, such as acetaminophen (Tylenol), ibuprofen (Advil, Motrin), or naproxen (Aleve), for cramps. Read and follow all instructions on the label. Do not take aspirin, because it can cause more bleeding. ¨ Do not take two or more pain medicines at the same time unless the doctor told you to. Many pain medicines have acetaminophen, which is Tylenol. Too much acetaminophen (Tylenol) can be harmful. · Stitches  ¨ If you have stitches, they will dissolve on their own and do not need to be removed. Follow your doctor's instructions for cleaning the stitched area.   ¨ Put ice or a cold pack on your painful area for 10 to 20 minutes at a time, several times a day, for the first few days. Put a thin cloth between the ice and your skin. ¨ Sit in a few inches of warm water (sitz bath) 3 times a day and after bowel movements. The warm water helps with pain and itching. If you do not have a tub, a warm shower might help. · Breast fullness  ¨ Your breasts may overfill (engorge) in the first few days after delivery. To help milk flow and to relieve pain, warm your breasts in the shower or by using warm, moist towels before nursing. ¨ If you are not nursing, do not put warmth on your breasts or touch your breasts. Wear a tight bra or sports bra and use ice until the fullness goes away. This usually takes 2 to 3 days. ¨ Put ice or a cold pack on your breast after nursing to reduce swelling and pain. Put a thin cloth between the ice and your skin. · Activity  ¨ Eat a balanced diet. Do not try to lose weight by cutting calories. Keep taking your prenatal vitamins, or take a multivitamin. ¨ Get as much rest as you can. Try to take naps when your baby sleeps during the day. ¨ Get some exercise every day. But do not do any heavy exercise until your doctor says it is okay. ¨ Wait until you are healed (about 4 to 6 weeks) before you have sexual intercourse. Your doctor will tell you when it is okay to have sex. ¨ Talk to your doctor about birth control. You can get pregnant even before your period returns. Also, you can get pregnant while you are breastfeeding. · Mental health  ¨ It is normal to have some sadness, anxiety, sleeplessness, and mood swings after you go home. If you feel upset or hopeless for more than a few days or are having trouble doing the things you need to do, talk to your doctor. · Constipation and hemorrhoids  ¨ Drink plenty of fluids, enough so that your urine is light yellow or clear like water.  If you have kidney, heart, or liver disease and have to limit fluids, talk with your doctor before you increase the amount of fluids you drink. ¨ Eat plenty of fiber each day. Have a bran muffin or bran cereal for breakfast, and try eating a piece of fruit for a mid-afternoon snack. ¨ For painful, itchy hemorrhoids, put ice or a cold pack on the area several times a day for 10 minutes at a time. Follow this by putting a warm compress on the area for another 10 to 20 minutes or by sitting in a shallow, warm bath. When should you call for help? Call 911 anytime you think you may need emergency care. For example, call if:  · You are thinking of hurting yourself, your baby, or anyone else. · You have sudden, severe pain in your belly. · You passed out (lost consciousness). Call your doctor now or seek immediate medical care if:  · You have severe vaginal bleeding. · You are soaking through a pad each hour for 2 or more hours. · Your vaginal bleeding seems to be getting heavier or is still bright red 4 days after delivery. · You are dizzy or lightheaded, or you feel like you may faint. · You are vomiting or cannot keep fluids down. · You have a fever. · You have new or more belly pain. · You pass tissue (not just blood). · Your vaginal discharge smells bad. · Your belly feels tender or full and hard. · Your breasts are continuously painful or red. · You feel sad, anxious, or hopeless for more than a few days. Watch closely for changes in your health, and be sure to contact your doctor if you have any problems. Where can you learn more? Go to http://angélica-gulshan.info/. Enter D583 in the search box to learn more about \"Vaginal Childbirth: Care Instructions. \"  Current as of: May 30, 2016  Content Version: 11.1  © 0045-2323 [employer]. Care instructions adapted under license by [employer] (which disclaims liability or warranty for this information).  If you have questions about a medical condition or this instruction, always ask your healthcare professional. [employer] disclaims any warranty or liability for your use of this information.

## 2017-01-23 ENCOUNTER — OFFICE VISIT (OUTPATIENT)
Dept: OBGYN CLINIC | Age: 32
End: 2017-01-23

## 2017-01-23 VITALS
SYSTOLIC BLOOD PRESSURE: 118 MMHG | HEIGHT: 65 IN | WEIGHT: 180 LBS | BODY MASS INDEX: 29.99 KG/M2 | DIASTOLIC BLOOD PRESSURE: 76 MMHG

## 2017-01-23 DIAGNOSIS — N90.89 VULVAR LESION: ICD-10-CM

## 2017-01-23 DIAGNOSIS — R10.2 PERINEAL PAIN: Primary | ICD-10-CM

## 2017-01-23 NOTE — MR AVS SNAPSHOT
Visit Information Date & Time Provider Department Dept. Phone Encounter #  
 1/23/2017 11:30 AM MD Sander Diaz [phone] [id number] Your Appointments 3/1/2017  4:20 PM  
POSTPARTUM VISIT with MD Sander Diaz ([facility]) Appt Note: 6wk post partum - del 01/15  
 [address] [facility] [address]  
[facility] [address] Upcoming Health Maintenance Date Due  
 PAP AKA CERVICAL CYTOLOGY 2/5/2017 Allergies as of 1/23/2017  Review Complete On: 1/23/2017 By: Carlos Ochoa LPN No Known Allergies Current Immunizations  Never Reviewed Name Date Influenza Vaccine (Quad) PF 10/31/2016 Tdap 10/31/2016 Not reviewed this visit Vitals BP Height(growth percentile) Weight(growth percentile) Breastfeeding? BMI OB Status 118/76 5' 5\" (1.651 m) 180 lb (81.6 kg) Yes 29.95 kg/m2 Recent pregnancy Smoking Status Never Smoker BMI and BSA Data Body Mass Index Body Surface Area  
 29.95 kg/m 2 1.93 m 2 Preferred Pharmacy Pharmacy Name Phone [facility] [place], [address] [facility] Your Updated Medication List  
  
   
This list is accurate as of: 1/23/17 12:04 PM.  Always use your most recent med list.  
  
  
  
  
 Willena Bone Take  by mouth. FISH OIL PO Take  by mouth. ibuprofen 600 mg tablet Commonly known as:  MOTRIN Take 1 Tab by mouth every six (6) hours as needed for Pain. Take with food. ondansetron 4 mg disintegrating tablet Commonly known as:  ZOFRAN ODT Take 1 Tab by mouth every eight (8) hours as needed for Nausea. oxyCODONE-acetaminophen 5-325 mg per tablet Commonly known as:  PERCOCET  
1-2 Tablets every 4-6 hours as needed PRENATAL VITAMIN PO Take  by mouth. PROBIOTIC PO Take  by mouth. ZANTAC 75 75 mg tablet Generic drug:  raNITIdine Take 75 mg by mouth two (2) times a day. Patient Instructions Learning About Starting to Breastfeed Planning ahead Before your baby is born, plan ahead. Learn all you can about breastfeeding. This helps make breastfeeding easier. · Early in your pregnancy, talk to your doctor or midwife about breastfeeding. · Learn the basics before your baby is born. The staff at hospitals and birthing centers can help you find a lactation specialist. This person is often a nurse who has been trained to teach and advise women about breastfeeding. Or you can take a breastfeeding class. · Plan ahead for times when you will need help after your baby is born. Many women get help from friends and family. Some join a support group to talk to other moms who breastfeed. · Buy the equipment you'll need. Examples are breast pads, nipple cream, extra pillows, and nursing bras. Find out about breast pumps too. Getting help from your hospital or birthing center It's important to have support from the doctors, nurses, and hospital staff who care for you and your baby. Before it's time for you to give birth, ask about the breastfeeding policies at your hospital or birthing center. Look for a hospital or birthing center that has policies for: · \"Rooming in. \" This policy encourages you to have your baby in the room with you. It can allow you to breastfeed more often. · Supplemental feedings. Tell the staff that your baby is to get only your breast milk from birth. If staff feed your baby water, sugar solution, or formula right after birth without a medical reason, it may make it harder for you to breastfeed. · Pacifiers or artificial nipples. Staff should not give your  these items without your permission. They may interfere with breastfeeding. · Follow-up.  Find out if your hospital can help you with breastfeeding issues after you go home. See if you can get information on support groups or other contacts. They might help if you need help setting up and staying with your breastfeeding routine. Your first feeding It's best to start breastfeeding within 1 hour of birth. For each feeding, you go through these basic steps: · Get ready for the feeding. Be calm and relaxed, and try not to be distracted. Get some water or juice for yourself. Use two or three pillows to help support your baby while he or she is nursing. · Find a breastfeeding position that is comfortable for you and your baby. Examples are the cradle and the football positions. Make sure the baby's head and chest are lined up straight and facing your breast. It's best to switch which breast you start with each time. · Get the baby latched on well. Your baby's mouth needs to be wide open, like a yawn, so you may need to gently touch the middle of your baby's lower lip. When your baby's mouth is open wide, quickly bring the baby onto your nipple and areola. The areola is the dark [place] around your nipple. · Provide a complete feeding. Let your baby nurse for at least 15 minutes. Be sure to burp your baby after each breast. 
In the first days after birth, your breasts make a thick, yellow liquid called colostrum. This liquid gives your baby nutrients and antibodies against infection. It is all that babies need at first. Your breasts will fill with milk a few days after the birth. Talk to your doctor, midwife, or lactation specialist right away if you are having problems and aren't sure what to do. How often to breastfeed Plan to breastfeed your baby on demand rather than setting a strict schedule. For the first few days, be prepared to breastfeed every 1 to 3 hours. That often works out to about 8 to 12 times in a 24-hour period. Wake a sleeping baby to feed, if you need to. If you breastfeed more often, it will help your breasts to produce more milk. After you go home After you're home, don't be afraid to call your doctor, midwife, or lactation specialist with questions. That's true even if you don't know what's bothering you. They are used to parents of newborns calling. They can help you figure out if there is a problem, and if so, how to fix it. Plan for times when you will be apart from your baby. Use a breast pump to collect breast milk ahead of time. You can store milk in the refrigerator or freezer. Then it's ready when someone else will be taking care of your baby. Breastfeeding is a learned skill that gets easier over time. You are more likely to succeed if you plan ahead, learn the basic techniques, and know where to get help and support. Where can you learn more? Go to http://angélica-gulshan.info/. Enter Q612 in the search box to learn more about \"Learning About Starting to Breastfeed. \" Current as of: May 30, 2016 Content Version: 11.1 © 2379-9126 Healthwise, Incorporated. Care instructions adapted under license by [employer] (which disclaims liability or warranty for this information). If you have questions about a medical condition or this instruction, always ask your healthcare professional. [address] any warranty or liability for your use of this information. Introducing \[facility]\"" & HEALTH SERVICES! Dear Zully Santos: 
Thank you for requesting a [email or web address] account. Our records indicate that you already have an active [email or web address] account. You can access your account anytime at [email or web address] Did you know that you can access your hospital and ER discharge instructions at any time in [email or web address]? You can also review all of your test results from your hospital stay or ER visit. Additional Information If you have questions, please visit the Frequently Asked Questions section of the [email or web address] website at [email or web address] Remember, MyChart is NOT to be used for urgent needs. For medical emergencies, dial 911. Now available from your iPhone and Android! Please provide this summary of care documentation to your next provider. Your primary care clinician is listed as FALLON ALFONSO. If you have any questions after today's visit, please call [phone].

## 2017-01-23 NOTE — PATIENT INSTRUCTIONS
Learning About Starting to Breastfeed  Planning ahead    Before your baby is born, plan ahead. Learn all you can about breastfeeding. This helps make breastfeeding easier. · Early in your pregnancy, talk to your doctor or midwife about breastfeeding. · Learn the basics before your baby is born. The staff at hospitals and birthing centers can help you find a lactation specialist. This person is often a nurse who has been trained to teach and advise women about breastfeeding. Or you can take a breastfeeding class. · Plan ahead for times when you will need help after your baby is born. Many women get help from friends and family. Some join a support group to talk to other moms who breastfeed. · Buy the equipment you'll need. Examples are breast pads, nipple cream, extra pillows, and nursing bras. Find out about breast pumps too. Getting help from your hospital or birthing center  It's important to have support from the doctors, nurses, and hospital staff who care for you and your baby. Before it's time for you to give birth, ask about the breastfeeding policies at your hospital or birthing center. Look for a hospital or birthing center that has policies for:  · \"Rooming in. \" This policy encourages you to have your baby in the room with you. It can allow you to breastfeed more often. · Supplemental feedings. Tell the staff that your baby is to get only your breast milk from birth. If staff feed your baby water, sugar solution, or formula right after birth without a medical reason, it may make it harder for you to breastfeed. · Pacifiers or artificial nipples. Staff should not give your  these items without your permission. They may interfere with breastfeeding. · Follow-up. Find out if your hospital can help you with breastfeeding issues after you go home. See if you can get information on support groups or other contacts.  They might help if you need help setting up and staying with your breastfeeding routine. Your first feeding  It's best to start breastfeeding within 1 hour of birth. For each feeding, you go through these basic steps:  · Get ready for the feeding. Be calm and relaxed, and try not to be distracted. Get some water or juice for yourself. Use two or three pillows to help support your baby while he or she is nursing. · Find a breastfeeding position that is comfortable for you and your baby. Examples are the cradle and the football positions. Make sure the baby's head and chest are lined up straight and facing your breast. It's best to switch which breast you start with each time. · Get the baby latched on well. Your baby's mouth needs to be wide open, like a yawn, so you may need to gently touch the middle of your baby's lower lip. When your baby's mouth is open wide, quickly bring the baby onto your nipple and areola. The areola is the dark [place] around your nipple. · Provide a complete feeding. Let your baby nurse for at least 15 minutes. Be sure to burp your baby after each breast.  In the first days after birth, your breasts make a thick, yellow liquid called colostrum. This liquid gives your baby nutrients and antibodies against infection. It is all that babies need at first. Your breasts will fill with milk a few days after the birth. Talk to your doctor, midwife, or lactation specialist right away if you are having problems and aren't sure what to do. How often to breastfeed  Plan to breastfeed your baby on demand rather than setting a strict schedule. For the first few days, be prepared to breastfeed every 1 to 3 hours. That often works out to about 8 to 12 times in a 24-hour period. Wake a sleeping baby to feed, if you need to. If you breastfeed more often, it will help your breasts to produce more milk. After you go home  After you're home, don't be afraid to call your doctor, midwife, or lactation specialist with questions.  That's true even if you don't know what's bothering you. They are used to parents of newborns calling. They can help you figure out if there is a problem, and if so, how to fix it. Plan for times when you will be apart from your baby. Use a breast pump to collect breast milk ahead of time. You can store milk in the refrigerator or freezer. Then it's ready when someone else will be taking care of your baby. Breastfeeding is a learned skill that gets easier over time. You are more likely to succeed if you plan ahead, learn the basic techniques, and know where to get help and support. Where can you learn more? Go to http://angélica-gulshan.info/. Enter W996 in the search box to learn more about \"Learning About Starting to Breastfeed. \"  Current as of: May 30, 2016  Content Version: 11.1  © 4007-6220 [employer]ing[employer], Incorporated. Care instructions adapted under license by Media[employer]hy (which disclaims liability or warranty for this information). If you have questions about a medical condition or this instruction, always ask your healthcare professional. [place] [address] any warranty or liability for your use of this information.

## 2017-01-23 NOTE — PROGRESS NOTES
[facility]  [email or web address]  [phone]    Sheila Salinas MD, FACOG       OB/GYN Problem visit    Chief Complaint:   Chief Complaint   Patient presents with   [address]     check episotomy       History of Present Illness: This is not a new problem being evaluated by this provider. The patient is a 28 y.o.  female who reports having vaginal pain from her episiotomy. Patient reports that she has been taking tylenol around the clock and using her ice packs. Patient reports that her pain has gotten better stating it's a 5/10. Patient has had some burning with urination. She reports the symptoms are has slightly improved. Can sit but is aggravated if she sits in one position too long. Aggravating factors include none. Alleviating factors include none. She does not have other concerns. LMP: No LMP recorded. PFSH:  Past Medical History   Diagnosis Date    Asthma     Fibrocystic breast disease 3/2012    Pap smear for cervical cancer screening 2016     negative    PCOS (polycystic ovarian syndrome)     Polycystic disease, ovaries      No past surgical history on file. Family History   Problem Relation Age of Onset    Other Sister      PCOS    Thyroid Disease Sister      hypothyroid, prolactinoma    Breast Cancer Paternal Aunt     Colon Cancer Maternal Grandfather     Cancer Paternal Grandmother      melanoma    Diabetes Paternal Grandfather     Heart Disease Paternal Grandfather     Diabetes Father     No Known Problems Mother      Social History   Substance Use Topics    Smoking status: Never Smoker    Smokeless tobacco: Never Used    Alcohol use No     No Known Allergies  Current Outpatient Prescriptions   Medication Sig    DOCUSATE SODIUM (COLACE PO) Take  by mouth.  PRENATAL VIT W-CA,FE,FA,<1 MG, (PRENATAL VITAMIN PO) Take  by mouth.  DOCOSAHEXANOIC ACID/EPA (FISH OIL PO) Take  by mouth.     LACTOBACILLUS ACIDOPHILUS (PROBIOTIC PO) Take  by mouth.  ibuprofen (MOTRIN) 600 mg tablet Take 1 Tab by mouth every six (6) hours as needed for Pain. Take with food.  oxyCODONE-acetaminophen (PERCOCET) 5-325 mg per tablet 1-2 Tablets every 4-6 hours as needed    ondansetron (ZOFRAN ODT) 4 mg disintegrating tablet Take 1 Tab by mouth every eight (8) hours as needed for Nausea.  raNITIdine (ZANTAC 75) 75 mg tablet Take 75 mg by mouth two (2) times a day. No current facility-administered medications for this visit. Review of Systems:  History obtained from the patient  Constitutional: negative for fevers, chills and weight loss  ENT ROS: negative for - hearing change, oral lesions or visual changes  Respiratory: negative for cough, wheezing or dyspnea on exertion  Cardiovascular: negative for chest pain, irregular heart beats, exertional chest pressure/discomfort  Gastrointestinal: negative for dysphagia, nausea and vomiting  Genito-Urinary ROS:  see HPI  Inteument/breast: negative for rash, breast lump and nipple discharge  Musculoskeletal:negative for stiff joints, neck pain and muscle weakness  Endocrine ROS: negative for - breast changes, galactorrhea or temperature intolerance  Hematological and Lymphatic ROS: negative for - blood clots, bruising or swollen lymph nodes    Physical Exam:  Visit Vitals    /76    Ht 5' 5\" (1.651 m)    Wt 180 lb (81.6 kg)    Breastfeeding Yes    BMI 29.95 kg/m2       GENERAL: alert, well appearing, and in no distress  HEAD: normocephalic, atraumatic. ABDOMEN: soft, nontender, nondistended, no masses or organomegaly   EGBUS: no lesions, decreased inflammation, decreased edema  VULVA: normal appearing vulva with no masses, tenderness or lesions  ADNEXA: normal adnexa in size, nontender and no masses  NEURO: alert, oriented, normal speech        Assessment:  Encounter Diagnoses   Name Primary?     Perineal pain Yes    Vulvar lesion        Plan:  The patient is advised that she should contact the office if she does not note improvement or if symptoms recur  Recommend follow up with PCP for non-gynecologic complaints and chronic medical problems. She should contact our office with any questions or concerns  She could keep her routine annual exam appointment. FU 1-2 wks  Continue routine perineal care. Notify MD if NI  Continue docusate/bowel regimen    No orders of the defined types were placed in this encounter. No results found for this visit on 01/23/17.

## 2017-02-02 ENCOUNTER — OFFICE VISIT (OUTPATIENT)
Dept: OBGYN CLINIC | Age: 32
End: 2017-02-02

## 2017-02-02 VITALS
SYSTOLIC BLOOD PRESSURE: 116 MMHG | BODY MASS INDEX: 29.66 KG/M2 | WEIGHT: 178 LBS | DIASTOLIC BLOOD PRESSURE: 68 MMHG | HEIGHT: 65 IN

## 2017-02-02 DIAGNOSIS — R10.2 PERINEAL PAIN: Primary | ICD-10-CM

## 2017-02-02 NOTE — PATIENT INSTRUCTIONS
Episiotomy: What to Expect at [address]  An episiotomy is a cut, or incision, in your perineum. Your perineum is the tissue between the vagina and anus. The cut is made before the baby's head is delivered during childbirth. It can help the doctor deliver the baby. After your baby is born, the doctor closes the incision with stitches. These stitches don't need to be removed. They will dissolve in 1 to 2 weeks or longer. You may notice pieces of the stitches on your sanitary pad or on toilet paper. This is normal.  Recovery can be uncomfortable. The amount of pain you have depends on how deep and long the incision is. You may have pain when you sit, walk, urinate, or have bowel movements. If you get enough fiber and fluids and use stool softeners or laxatives, you may have less pain during bowel movements. Using ice packs or sitting in warm water (a sitz bath) several times a day may also help with pain. Most women say they have less pain or discomfort after the first week. Most episiotomies heal in 3 weeks. But it may take longer. This care sheet gives you a general idea about how long it will take for you to recover. But each person recovers at a different pace. Follow the steps below to get better as quickly as possible. How can you care for yourself at home? Activity  · Rest when you feel tired. · Be active. Walking is a good choice. · Allow your body to heal. Don't move quickly or lift anything heavier than your baby until you are feeling better. · Ask your doctor when you can drive again. · You may shower and take baths as usual. Pat the incision dry when you are done. · You will have some vaginal bleeding. Wear sanitary pads. Do not douche or use tampons until your doctor says it is okay. · Ask your doctor when it is okay for you to have sex. Diet  · You can eat your normal diet. · Drink plenty of fluids (unless your doctor tells you not to).   · If your bowel movements are not regular right after surgery, try to avoid constipation and straining. Drink plenty of water. Your doctor may suggest fiber, a stool softener, or a mild laxative. Medicines  · Your doctor will tell you if and when you can restart your medicines. He or she will also give you instructions about taking any new medicines. · If you take blood thinners, such as warfarin (Coumadin), clopidogrel (Plavix), or aspirin, be sure to talk to your doctor. He or she will tell you if and when to start taking those medicines again. Make sure that you understand exactly what your doctor wants you to do. · Be safe with medicines. Read and follow all instructions on the label. ¨ If the doctor gave you a prescription medicine for pain, take it as prescribed. ¨ If you are not taking a prescription pain medicine, ask your doctor if you can take an over-the-counter medicine. Incision care  · Put ice or a cold pack on the sore area for 10 to 20 minutes at a time. Put a thin cloth between the ice and your skin. · Sit in a few inches of warm water (sitz bath) 3 times a day and after bowel movements. The warm water helps with pain and itching. It may feel better to dry the area with a hair dryer instead of a towel. · After you use the toilet, pour or spray warm water over your vagina and anus. This will help keep the area clean. · After a bowel movement, it may feel better to wipe with baby wipes or medicated pads, such as Tucks. Follow-up care is a key part of your treatment and safety. Be sure to make and go to all appointments, and call your doctor if you are having problems. It's also a good idea to know your test results and keep a list of the medicines you take. When should you call for help? Call 911 anytime you think you may need emergency care. For example, call if:  · You passed out (lost consciousness). Call your doctor now or seek immediate medical care if:  · You have severe vaginal bleeding.  This means you are passing blood clots and soaking through a pad each hour for 2 or more hours. · You are dizzy or lightheaded, or you feel like you may faint. · You have a fever. · You have new belly pain or your pain gets worse. Watch closely for changes in your health, and be sure to contact your doctor if:  · Your vaginal bleeding seems heavier. · You have new or worse vaginal discharge. · You do not get better as expected. Where can you learn more? Go to http://angélica-gulshan.info/. Enter M523 in the search box to learn more about \"Episiotomy: What to Expect at Home. \"  Current as of: May 30, 2016  Content Version: 11.1  © 4131-7040 [employer], Incorporated. Care instructions adapted under license by Runner (which disclaims liability or warranty for this information). If you have questions about a medical condition or this instruction, always ask your healthcare professional. [address] any warranty or liability for your use of this information.

## 2017-02-02 NOTE — MR AVS SNAPSHOT
Visit Information Date & Time Provider Department Dept. Phone Encounter #  
 2/2/2017  3:00 PM Los Oden MD [facility] 90 [id number] Your Appointments 3/1/2017  4:20 PM  
POSTPARTUM VISIT with Los Oden MD  
[facility] Appt Note: 6wk post partum - del 01/15  
 [address] [facility] [address] [place] [address] [phone]  
[facility] [address] Upcoming Health Maintenance Date Due  
 PAP AKA CERVICAL CYTOLOGY 2/5/2017 Allergies as of 2/2/2017  Review Complete On: 2/2/2017 By: Nick Pride LPN No Known Allergies Current Immunizations  Never Reviewed Name Date Influenza Vaccine (Quad) PF 10/31/2016 Tdap 10/31/2016 Not reviewed this visit Vitals BP Height(growth percentile) Weight(growth percentile) BMI OB Status Smoking Status 116/68 5' 5\" (1.651 m) 178 lb (80.7 kg) 29.62 kg/m2 Recent pregnancy Never Smoker BMI and BSA Data Body Mass Index Body Surface Area  
 29.62 kg/m 2 1.92 m 2 Preferred Pharmacy Pharmacy Name Phone [facility], [address] [facility] Your Updated Medication List  
  
   
This list is accurate as of: 2/2/17  3:28 PM.  Always use your most recent med list.  
  
  
  
  
 Susa  Take  by mouth. FISH OIL PO Take  by mouth. ibuprofen 600 mg tablet Commonly known as:  MOTRIN Take 1 Tab by mouth every six (6) hours as needed for Pain. Take with food. ondansetron 4 mg disintegrating tablet Commonly known as:  ZOFRAN ODT Take 1 Tab by mouth every eight (8) hours as needed for Nausea. oxyCODONE-acetaminophen 5-325 mg per tablet Commonly known as:  PERCOCET  
1-2 Tablets every 4-6 hours as needed PRENATAL VITAMIN PO Take  by mouth. PROBIOTIC PO Take  by mouth. ZANTAC 75 75 mg tablet Generic drug:  raNITIdine Take 75 mg by mouth two (2) times a day. Patient Instructions Episiotomy: What to Expect at [facility] Your Recovery An episiotomy is a cut, or incision, in your perineum. Your perineum is the tissue between the vagina and anus. The cut is made before the baby's head is delivered during childbirth. It can help the doctor deliver the baby. After your baby is born, the doctor closes the incision with stitches. These stitches don't need to be removed. They will dissolve in 1 to 2 weeks or longer. You may notice pieces of the stitches on your sanitary pad or on toilet paper. This is normal. 
Recovery can be uncomfortable. The amount of pain you have depends on how deep and long the incision is. You may have pain when you sit, walk, urinate, or have bowel movements. If you get enough fiber and fluids and use stool softeners or laxatives, you may have less pain during bowel movements. Using ice packs or sitting in warm water (a sitz bath) several times a day may also help with pain. Most women say they have less pain or discomfort after the first week. Most episiotomies heal in 3 weeks. But it may take longer. This care sheet gives you a general idea about how long it will take for you to recover. But each person recovers at a different pace. Follow the steps below to get better as quickly as possible. How can you care for yourself at home? Activity · Rest when you feel tired. · Be active. Walking is a good choice. · Allow your body to heal. Don't move quickly or lift anything heavier than your baby until you are feeling better. · Ask your doctor when you can drive again. · You may shower and take baths as usual. Pat the incision dry when you are done. · You will have some vaginal bleeding. Wear sanitary pads. Do not douche or use tampons until your doctor says it is okay. · Ask your doctor when it is okay for you to have sex. Diet · You can eat your normal diet. · Drink plenty of fluids (unless your doctor tells you not to). · If your bowel movements are not regular right after surgery, try to avoid constipation and straining. Drink plenty of water. Your doctor may suggest fiber, a stool softener, or a mild laxative. Medicines · Your doctor will tell you if and when you can restart your medicines. He or she will also give you instructions about taking any new medicines. · If you take blood thinners, such as warfarin (Coumadin), clopidogrel (Plavix), or aspirin, be sure to talk to your doctor. He or she will tell you if and when to start taking those medicines again. Make sure that you understand exactly what your doctor wants you to do. · Be safe with medicines. Read and follow all instructions on the label. ¨ If the doctor gave you a prescription medicine for pain, take it as prescribed. ¨ If you are not taking a prescription pain medicine, ask your doctor if you can take an over-the-counter medicine. Incision care · Put ice or a cold pack on the sore area for 10 to 20 minutes at a time. Put a thin cloth between the ice and your skin. · Sit in a few inches of warm water (sitz bath) 3 times a day and after bowel movements. The warm water helps with pain and itching. It may feel better to dry the area with a hair dryer instead of a towel. · After you use the toilet, pour or spray warm water over your vagina and anus. This will help keep the area clean. · After a bowel movement, it may feel better to wipe with baby wipes or medicated pads, such as Tucks. Follow-up care is a key part of your treatment and safety. Be sure to make and go to all appointments, and call your doctor if you are having problems. It's also a good idea to know your test results and keep a list of the medicines you take. When should you call for help? Call 911 anytime you think you may need emergency care. For example, call if: · You passed out (lost consciousness). Call your doctor now or seek immediate medical care if: 
· You have severe vaginal bleeding. This means you are passing blood clots and soaking through a pad each hour for 2 or more hours. · You are dizzy or lightheaded, or you feel like you may faint. · You have a fever. · You have new belly pain or your pain gets worse. Watch closely for changes in your health, and be sure to contact your doctor if: 
· Your vaginal bleeding seems heavier. · You have new or worse vaginal discharge. · You do not get better as expected. Where can you learn more? Go to http://angélica-gulshan.info/. Enter E567 in the search box to learn more about \"Episiotomy: What to Expect at Home. \" Current as of: May 30, 2016 Content Version: 11.1 © 6221-1609 [employer]io[employer]. Care instructions adapted under license by Dragon Ports (which disclaims liability or warranty for this information). If you have questions about a medical condition or this instruction, always ask your healthcare professional. [address] any warranty or liability for your use of this information. Introducing [facility]! Dear Chip Sommers: 
Thank you for requesting a Codenvy account. Our records indicate that you already have an active Codenvy account. You can access your account anytime at [email or web address] Did you know that you can access your hospital and ER discharge instructions at any time in Codenvy? You can also review all of your test results from your hospital stay or ER visit. Additional Information If you have questions, please visit the Frequently Asked Questions section of the Codenvy website at [email or web address] Remember, Codenvy is NOT to be used for urgent needs. For medical emergencies, dial 911. Now available from your iPhone and Android! Please provide this summary of care documentation to your next provider. Your primary care clinician is listed as FALLON ALFONSO. If you have any questions after today's visit, please call [phone].

## 2017-02-02 NOTE — PROGRESS NOTES
[address] [facility]  [email or web address]  [phone]    Kiel Rodríguez MD, FACOG       OB/GYN Problem visit    Chief Complaint:   Chief Complaint   Patient presents with   [facility]     perineum check       History of Present Illness: This is not a new problem being evaluated by this provider. The patient is a 28 y.o.  female who reports having vaginal pain and swelling from epi stomy area. Patient states that she has started to taking ibuprofen once in the morning, once at night to help with pain. Pain level has gotten better, but patient notices small amount of swelling in the area. She reports the symptoms are has moderately improved and has significantly improved. Aggravating factors include none. Alleviating factors include none. She does not have other concerns. LMP: No LMP recorded. PFSH:  Past Medical History   Diagnosis Date    Asthma     Fibrocystic breast disease 3/2012    Pap smear for cervical cancer screening 2016     negative    PCOS (polycystic ovarian syndrome)     Polycystic disease, ovaries      No past surgical history on file. Family History   Problem Relation Age of Onset    Other Sister      PCOS    Thyroid Disease Sister      hypothyroid, prolactinoma    Breast Cancer Paternal Aunt     Colon Cancer Maternal Grandfather     Cancer Paternal Grandmother      melanoma    Diabetes Paternal Grandfather     Heart Disease Paternal Grandfather     Diabetes Father     No Known Problems Mother      Social History   Substance Use Topics    Smoking status: Never Smoker    Smokeless tobacco: Never Used    Alcohol use No     No Known Allergies  Current Outpatient Prescriptions   Medication Sig    PRENATAL VIT W-CA,FE,FA,<1 MG, (PRENATAL VITAMIN PO) Take  by mouth.  DOCOSAHEXANOIC ACID/EPA (FISH OIL PO) Take  by mouth.  LACTOBACILLUS ACIDOPHILUS (PROBIOTIC PO) Take  by mouth.     DOCUSATE SODIUM (COLACE PO) Take  by mouth.  ibuprofen (MOTRIN) 600 mg tablet Take 1 Tab by mouth every six (6) hours as needed for Pain. Take with food.  oxyCODONE-acetaminophen (PERCOCET) 5-325 mg per tablet 1-2 Tablets every 4-6 hours as needed    ondansetron (ZOFRAN ODT) 4 mg disintegrating tablet Take 1 Tab by mouth every eight (8) hours as needed for Nausea.  raNITIdine (ZANTAC 75) 75 mg tablet Take 75 mg by mouth two (2) times a day. No current facility-administered medications for this visit. Review of Systems:  History obtained from the patient  Constitutional: negative for fevers, chills and weight loss  ENT ROS: negative for - hearing change, oral lesions or visual changes  Respiratory: negative for cough, wheezing or dyspnea on exertion  Cardiovascular: negative for chest pain, irregular heart beats, exertional chest pressure/discomfort  Gastrointestinal: negative for dysphagia, nausea and vomiting  Genito-Urinary ROS:  see HPI  Inteument/breast: negative for rash, breast lump and nipple discharge  Musculoskeletal:negative for stiff joints, neck pain and muscle weakness  Endocrine ROS: negative for - breast changes, galactorrhea or temperature intolerance  Hematological and Lymphatic ROS: negative for - blood clots, bruising or swollen lymph nodes    Physical Exam:  Visit Vitals    /68    Ht 5' 5\" (1.651 m)    Wt 178 lb (80.7 kg)    BMI 29.62 kg/m2       GENERAL: alert, well appearing, and in no distress  HEAD: normocephalic, atraumatic. ABDOMEN: soft, nontender, nondistended, no masses or organomegaly   EGBUS: no lesions, no inflammation, no masses, healing well: Three loose sutures grasped and trimmed.    VULVA: normal appearing vulva with no masses, tenderness or lesions  VAGINA: normal appearing vagina with normal color, no lesions, no discharge  CERVIX: normal appearing cervix without discharge or lesions, non tender  UTERUS: uterus is normal size, shape, consistency and nontender   ADNEXA: normal adnexa in size, nontender and no masses  NEURO: alert, oriented, normal speech    Assessment:  Encounter Diagnoses   Name Primary?  Perineal pain Yes    Comment: improving       Plan:  The patient is advised that she should contact the office if she does not note improvement or if symptoms recur  Recommend follow up with PCP for non-gynecologic complaints and chronic medical problems. She should contact our office with any questions or concerns  She could keep her routine postpartum exam appointment. Disc good vulvar hygiene    No orders of the defined types were placed in this encounter. No results found for this visit on 02/02/17.

## 2017-03-01 ENCOUNTER — OFFICE VISIT (OUTPATIENT)
Dept: OBGYN CLINIC | Age: 32
End: 2017-03-01

## 2017-03-01 VITALS
DIASTOLIC BLOOD PRESSURE: 62 MMHG | WEIGHT: 179 LBS | HEIGHT: 65 IN | SYSTOLIC BLOOD PRESSURE: 115 MMHG | BODY MASS INDEX: 29.82 KG/M2

## 2017-03-01 RX ORDER — ACETAMINOPHEN AND CODEINE PHOSPHATE 120; 12 MG/5ML; MG/5ML
1 SOLUTION ORAL DAILY
Qty: 3 PACKAGE | Refills: 0 | Status: SHIPPED | OUTPATIENT
Start: 2017-03-01 | End: 2017-07-07

## 2017-03-01 NOTE — MR AVS SNAPSHOT
Visit Information Date & Time Provider Department Dept. Phone Encounter #  
 3/1/2017  4:20 PM Bulmaro Alex MD [address] [id number] Upcoming Health Maintenance Date Due  
 PAP AKA CERVICAL CYTOLOGY 2/5/2017 Allergies as of 3/1/2017  Review Complete On: 3/1/2017 By: Delfino Blair LPN No Known Allergies Current Immunizations  Never Reviewed Name Date Influenza Vaccine (Quad) PF 10/31/2016 Tdap 10/31/2016 Not reviewed this visit Vitals BP  
  
  
  
  
  
 115/62 BMI and BSA Data Body Mass Index Body Surface Area  
 29.79 kg/m 2 1.93 m 2 Preferred Pharmacy Pharmacy Name Phone [facility] [address]  Your Updated Medication List  
  
   
This list is accurate as of: 3/1/17  4:41 PM.  Always use your most recent med list.  
  
  
  
  
 Hedwig Leonardo Take  by mouth. FISH OIL PO Take  by mouth. ibuprofen 600 mg tablet Commonly known as:  MOTRIN Take 1 Tab by mouth every six (6) hours as needed for Pain. Take with food. ondansetron 4 mg disintegrating tablet Commonly known as:  ZOFRAN ODT Take 1 Tab by mouth every eight (8) hours as needed for Nausea. oxyCODONE-acetaminophen 5-325 mg per tablet Commonly known as:  PERCOCET  
1-2 Tablets every 4-6 hours as needed PRENATAL VITAMIN PO Take  by mouth. PROBIOTIC PO Take  by mouth. ZANTAC 75 75 mg tablet Generic drug:  raNITIdine Take 75 mg by mouth two (2) times a day. Patient Instructions Depression After Childbirth: Care Instructions Your Care Instructions Many women get the \"baby blues\" during the first few days after childbirth. You may lose sleep, feel irritable, and cry easily. You may feel happy one minute and sad the next.  Hormone changes are one cause of these emotional changes. Also, the demands of a new baby, along with visits from relatives or other family needs, add to a mother's stress. The \"baby blues\" often peak around the fourth day. Then they ease up in less than 2 weeks. If your moodiness or anxiety lasts for more than 2 weeks, or if you feel like life is not worth living, you may have postpartum depression. This is different for each mother. Some mothers with serious depression may worry intensely about their infant's well-being. Others may feel distant from their child. Some mothers might even feel that they might harm their baby. A mother may have signs of paranoia, wondering if someone is watching her. Depression is not a sign of weakness. It is a medical condition that requires treatment. Medicine and counseling often work well to reduce depression. Talk to your doctor about taking antidepressant medicine while breastfeeding. Follow-up care is a key part of your treatment and safety. Be sure to make and go to all appointments, and call your doctor if you are having problems. It's also a good idea to know your test results and keep a list of the medicines you take. How do you know if you are depressed? With all the changes in your life, you may not know if you are depressed. Pregnancy sometimes causes changes in how you feel that are similar to the symptoms of depression. Symptoms of depression include: · Feeling sad or hopeless and losing interest in daily activities. These are the most common symptoms of depression. · Sleeping too much or not enough. · Feeling tired. You may feel as if you have no energy. · Eating too much or too little. · Writing or talking about death, such as writing suicide notes or talking about guns, knives, or pills. Keep the numbers for these national suicide hotlines: 4-754-378-TALK (2-399.245.4053) and 0-625-AWWEDZE (8-660.363.8013).  If you or someone you know talks about suicide or feeling hopeless, get help right away. How can you care for yourself at home? · Be safe with medicines. Take your medicines exactly as prescribed. Call your doctor if you think you are having a problem with your medicine. · Eat a healthy diet so that you can keep up your energy. · Get regular daily exercise, such as walks, to help improve your mood. · Get as much sunlight as possible. Keep your shades and curtains open. Get outside as much as you can. · Avoid using alcohol or other substances to feel better. · Get as much rest and sleep as possible. Avoid doing too much. Being too tired can increase depression. · Play stimulating music throughout your day and soothing music at night. · Schedule outings and visits with friends and family. Ask them to call you regularly, so that you do not feel alone. · Ask for help with preparing food and other daily tasks. Family and friends are often happy to help a mother with a . · Be honest with yourself and those who care about you. Tell them about your struggle. · Join a support group of new mothers. No one can better understand the challenges of caring for a  than other new mothers. · If you feel like life is not worth living or are feeling hopeless, get help right away. Keep the numbers for these national suicide hotlines: 6-066-340-TALK (7-153.595.1700) and 2-454-KKVEWDU (3-394.717.5708). When should you call for help? Call 911 anytime you think you may need emergency care. For example, call if: 
· You feel you cannot stop from hurting yourself, your baby, or someone else. Call your doctor now or seek immediate medical care if: 
· You are having trouble caring for yourself or your baby. · You hear voices. Watch closely for changes in your health, and be sure to contact your doctor if: 
· You have problems with your depression medicine. · You do not get better as expected. Where can you learn more? Go to http://angélica-gulshan.info/. Enter Y738 in the search box to learn more about \"Depression After Childbirth: Care Instructions. \" Current as of: 2016 Content Version: 11.1 © 5311-6691 [employer]. Care instructions adapted under license by [employer] (which disclaims liability or warranty for this information). If you have questions about a medical condition or this instruction, always ask your healthcare professional. [address] any warranty or liability for your use of this information. Introducing [facility]! Dear Amina Morales: 
Thank you for requesting a EarDish account. Our records indicate that you already have an active EarDish account. You can access your account anytime at [email or web address] Did you know that you can access your hospital and ER discharge instructions at any time in EarDish? You can also review all of your test results from your hospital stay or ER visit. Additional Information If you have questions, please visit the Frequently Asked Questions section of the EarDish website at [email or web address] Remember, EarDish is NOT to be used for urgent needs. For medical emergencies, dial 911. Now available from your iPhone and Android! Please provide this summary of care documentation to your next provider. Your primary care clinician is listed as FALLON ALFONSO. If you have any questions after today's visit, please call [phone].

## 2017-03-01 NOTE — PROGRESS NOTES
Patrick Da Silva MD, [address] [facility]     Postpartum visit    Chief Complaint   Patient presents with   [address] is a 28 y.o. female  who presents for a postpartum exam.     She is now 6 weeks from a vaginal delivery delivery. No LMP recorded. She has had the following significant problems since her delivery: none. She reports her mood as Good. The patient is breastfeeding/pumping breast milk for her baby. The patient would like to discuss birth control options. She is due for her next AE in 3 months. Past Medical History:   Diagnosis Date    Asthma     Fibrocystic breast disease 3/2012    Pap smear for cervical cancer screening 2016    negative    PCOS (polycystic ovarian syndrome)     Polycystic disease, ovaries      No past surgical history on file. Current Outpatient Prescriptions   Medication Sig    norethindrone (MICRONOR) 0.35 mg tab Take 1 Tab by mouth daily.  PRENATAL VIT W-CA,FE,FA,<1 MG, (PRENATAL VITAMIN PO) Take  by mouth.  DOCOSAHEXANOIC ACID/EPA (FISH OIL PO) Take  by mouth.  LACTOBACILLUS ACIDOPHILUS (PROBIOTIC PO) Take  by mouth. No current facility-administered medications for this visit. No Known Allergies  Family History   Problem Relation Age of Onset    Other Sister      PCOS    Thyroid Disease Sister      hypothyroid, prolactinoma    Breast Cancer Paternal Aunt     Colon Cancer Maternal Grandfather     Cancer Paternal Grandmother      melanoma    Diabetes Paternal Grandfather     Heart Disease Paternal Grandfather     Diabetes Father     No Known Problems Mother      Social History     Social History    Marital status:      Spouse name: N/A    Number of children: N/A    Years of education: N/A     Occupational History    Not on file.      Social History Main Topics    Smoking status: Never Smoker    Smokeless tobacco: Never Used    Alcohol use No    Drug use: No    Sexual activity: Yes     Partners: Male     Birth control/ protection: None     Other Topics Concern    Not on file     Social History Narrative     OB History      Para Term  AB TAB SAB Ectopic Multiple Living    1 1 1 0 0 0 0 0 0 1          Immunization History   Administered Date(s) Administered    Influenza Vaccine (Quad) PF 10/31/2016    Tdap 10/31/2016       Review of Systems:  History obtained from the patient  General ROS: negative for - chills, fever or weight loss  Respiratory ROS: no cough, shortness of breath, or wheezing  Cardiovascular ROS: no chest pain or dyspnea on exertion  Gastrointestinal ROS: negative for - appetite loss, change in bowel habits or nausea/vomiting  Genito-Urinary ROS: negative except for as noted in HPI    PHYSICAL EXAMINATION  Visit Vitals    /62    Ht 5' 5\" (1.651 m)    Wt 179 lb (81.2 kg)    BMI 29.79 kg/m2       Constitutional  · Appearance: well-nourished, well developed, alert, in no acute distress    HENT  · Head and Face: appears normal    Neck  · Inspection/Palpation: normal appearance, no masses or tenderness  · Lymph Nodes: no lymphadenopathy present  · Thyroid: gland size normal, nontender, no nodules or masses present on palpation    Chest  clear to auscultation, no wheezes, rales or rhonchi, symmetric air entry. Cardiac/CVS  normal rate, regular rhythm, normal S1, S2, no murmurs, rubs, clicks or gallops.     Breasts  · Inspection of Breasts: breasts symmetrical, no skin changes, no discharge present, nipple appearance normal, no skin retraction present  · Palpation of Breasts and Axillae: no masses present on palpation, no breast tenderness  · Axillary Lymph Nodes: no lymphadenopathy present    Gastrointestinal  · Abdominal Examination: abdomen non-tender to palpation, normal bowel sounds, no masses present  · Liver and spleen: no hepatomegaly present, spleen not palpable  · Hernias: no hernias identified    Genitourinary  · External Genitalia: normal appearance for age, no discharge present, no tenderness present, no inflammatory lesions present, no masses present, no atrophy present  · Vagina: normal vaginal vault without central or paravaginal defects, no discharge present, no inflammatory lesions present, no masses present  · Bladder: non-tender to palpation  · Urethra: appears normal  · Cervix: normal   · Uterus: normal size, shape and consistency  · Adnexa: no adnexal tenderness present, no adnexal masses present  · Perineum: perineum within normal limits, no evidence of trauma, no rashes or skin lesions present  · Anus: anus within normal limits  · Inguinal Lymph Nodes: no lymphadenopathy present    Skin  · General Inspection: no rash, no lesions identified    Neurologic/Psychiatric  · Mental Status:  · Orientation: grossly oriented to person, place and time  · Mood and Affect: mood normal, affect appropriate    Assessment:  Normal postpartum check  Encounter Diagnosis   Name Primary?  Routine postpartum follow-up Yes       Plan:  RTO for AE or sooner prn  Discussed contraception options; r/b/a. Planned contraception: micronor or condoms  Recommend back up with micronor/minipill with new start and if not exclusively breast feeding. Reviewed the importance of taking it at the same time each day. She should return to normal activity  We recommend healthy balanced diet, regular exercise  We discussed safer sex practices, condom use and risk factors for sexually transmitted diseases.    Patient should notify MD if she cannot resume normal activity and exercise  Recommended continuing prenatal vitamins/folic acid

## 2017-03-01 NOTE — PATIENT INSTRUCTIONS
Depression After Childbirth: Care Instructions  Your Care Instructions  Many women get the \"baby blues\" during the first few days after childbirth. You may lose sleep, feel irritable, and cry easily. You may feel happy one minute and sad the next. Hormone changes are one cause of these emotional changes. Also, the demands of a new baby, along with visits from relatives or other family needs, add to a mother's stress. The \"baby blues\" often peak around the fourth day. Then they ease up in less than 2 weeks. If your moodiness or anxiety lasts for more than 2 weeks, or if you feel like life is not worth living, you may have postpartum depression. This is different for each mother. Some mothers with serious depression may worry intensely about their infant's well-being. Others may feel distant from their child. Some mothers might even feel that they might harm their baby. A mother may have signs of paranoia, wondering if someone is watching her. Depression is not a sign of weakness. It is a medical condition that requires treatment. Medicine and counseling often work well to reduce depression. Talk to your doctor about taking antidepressant medicine while breastfeeding. Follow-up care is a key part of your treatment and safety. Be sure to make and go to all appointments, and call your doctor if you are having problems. It's also a good idea to know your test results and keep a list of the medicines you take. How do you know if you are depressed? With all the changes in your life, you may not know if you are depressed. Pregnancy sometimes causes changes in how you feel that are similar to the symptoms of depression. Symptoms of depression include:  · Feeling sad or hopeless and losing interest in daily activities. These are the most common symptoms of depression. · Sleeping too much or not enough. · Feeling tired. You may feel as if you have no energy. · Eating too much or too little.   · Writing or talking about death, such as writing suicide notes or talking about guns, knives, or pills. Keep the numbers for these national suicide hotlines: -TALK (6-937.292.6348) and 4-216-PQAYSTA (2-466.571.3445). If you or someone you know talks about suicide or feeling hopeless, get help right away. How can you care for yourself at home? · Be safe with medicines. Take your medicines exactly as prescribed. Call your doctor if you think you are having a problem with your medicine. · Eat a healthy diet so that you can keep up your energy. · Get regular daily exercise, such as walks, to help improve your mood. · Get as much sunlight as possible. Keep your shades and curtains open. Get outside as much as you can. · Avoid using alcohol or other substances to feel better. · Get as much rest and sleep as possible. Avoid doing too much. Being too tired can increase depression. · Play stimulating music throughout your day and soothing music at night. · Schedule outings and visits with friends and family. Ask them to call you regularly, so that you do not feel alone. · Ask for help with preparing food and other daily tasks. Family and friends are often happy to help a mother with a . · Be honest with yourself and those who care about you. Tell them about your struggle. · Join a support group of new mothers. No one can better understand the challenges of caring for a  than other new mothers. · If you feel like life is not worth living or are feeling hopeless, get help right away. Keep the numbers for these national suicide hotlines: -TALK (8-524.733.6196) and 4-364-VQDYXRP (4-966.717.3476). When should you call for help? Call 911 anytime you think you may need emergency care. For example, call if:  · You feel you cannot stop from hurting yourself, your baby, or someone else. Call your doctor now or seek immediate medical care if:  · You are having trouble caring for yourself or your baby.   · You hear voices. Watch closely for changes in your health, and be sure to contact your doctor if:  · You have problems with your depression medicine. · You do not get better as expected. Where can you learn more? Go to http://angélica-gulshan.info/. Enter T672 in the search box to learn more about \"Depression After Childbirth: Care Instructions. \"  Current as of: July 26, 2016  Content Version: 11.1  © 20060118-0849 [employer]. Care instructions adapted under license by [employer] (which disclaims liability or warranty for this information). If you have questions about a medical condition or this instruction, always ask your healthcare professional. [place] [address] any warranty or liability for your use of this information.

## 2017-03-28 ENCOUNTER — TELEPHONE (OUTPATIENT)
Dept: OBGYN CLINIC | Age: 32
End: 2017-03-28

## 2017-03-28 NOTE — TELEPHONE ENCOUNTER
PP Pt called w/c/o:  Crying  Fatigue  Unable to sleep  Overwhelmed  Advised pt to scheduled an appt to discuss which she agreed  Appt 3/29/2017.     FYI only

## 2017-03-29 ENCOUNTER — OFFICE VISIT (OUTPATIENT)
Dept: OBGYN CLINIC | Age: 32
End: 2017-03-29

## 2017-03-29 ENCOUNTER — TELEPHONE (OUTPATIENT)
Dept: OBGYN CLINIC | Age: 32
End: 2017-03-29

## 2017-03-29 VITALS
DIASTOLIC BLOOD PRESSURE: 70 MMHG | WEIGHT: 181 LBS | BODY MASS INDEX: 30.16 KG/M2 | HEIGHT: 65 IN | SYSTOLIC BLOOD PRESSURE: 114 MMHG

## 2017-03-29 DIAGNOSIS — F41.9 ANXIETY DISORDER, UNSPECIFIED TYPE: ICD-10-CM

## 2017-03-29 RX ORDER — BUPROPION HYDROCHLORIDE 150 MG/1
150 TABLET, EXTENDED RELEASE ORAL 2 TIMES DAILY
Qty: 60 TAB | Refills: 0 | Status: SHIPPED | OUTPATIENT
Start: 2017-03-29 | End: 2017-04-05 | Stop reason: SDUPTHER

## 2017-03-29 NOTE — PROGRESS NOTES
[facility]  [email or web address]  [phone]    Kelby Landin MD, FACOG       OB/GYN Problem visit    Chief Complaint:   Chief Complaint   Patient presents with    Depression     Post partum       History of Present Illness: This is a new problem being evaluated by this provider. The patient is a 28 y.o.  female who reports having possible post partum depression. Patient states that it has been lingering since a few days after delivery, but has progressivly gotten worse. Patient reports that she does not feel negative feelings towards her baby, but any little problem causes her to get upset and cry. Patient reports that after she went to the pediatrician for her baby's 2 month shots he did not sleep well after and has been very emotional since then. Patient reports feeling overwhelmed and upset all the time. She reports the symptoms are has worsened slightly. Aggravating factors include none. No SI or HI  Alleviating factors include none. H/o anxiety tried lexapro but felt depressed so stopped. She does not have other concerns. LMP: No LMP recorded. PFSH:  Past Medical History:   Diagnosis Date    Asthma     Fibrocystic breast disease 3/2012    Pap smear for cervical cancer screening 2016    negative    PCOS (polycystic ovarian syndrome)     Polycystic disease, ovaries      No past surgical history on file.   Family History   Problem Relation Age of Onset    Other Sister      PCOS    Thyroid Disease Sister      hypothyroid, prolactinoma    Breast Cancer Paternal Aunt     Colon Cancer Maternal Grandfather     Cancer Paternal Grandmother      melanoma    Diabetes Paternal Grandfather     Heart Disease Paternal Grandfather     Diabetes Father     No Known Problems Mother      Social History   Substance Use Topics    Smoking status: Never Smoker    Smokeless tobacco: Never Used    Alcohol use No     No Known Allergies  Current Outpatient Prescriptions   Medication Sig    CALCIUM PO Take  by mouth.  buPROPion SR (WELLBUTRIN SR) 150 mg SR tablet Take 1 Tab by mouth two (2) times a day. Start with one tablet po qam x 3 days    PRENATAL VIT W-CA,FE,FA,<1 MG, (PRENATAL VITAMIN PO) Take  by mouth.  DOCOSAHEXANOIC ACID/EPA (FISH OIL PO) Take  by mouth.  LACTOBACILLUS ACIDOPHILUS (PROBIOTIC PO) Take  by mouth.  norethindrone (MICRONOR) 0.35 mg tab Take 1 Tab by mouth daily. No current facility-administered medications for this visit. Review of Systems:  History obtained from the patient  Constitutional: negative for fevers, chills and weight loss  ENT ROS: negative for - hearing change, oral lesions or visual changes  Respiratory: negative for cough, wheezing or dyspnea on exertion  Cardiovascular: negative for chest pain, irregular heart beats, exertional chest pressure/discomfort  Gastrointestinal: negative for dysphagia, nausea and vomiting  Genito-Urinary ROS:  see HPI  Inteument/breast: negative for rash, breast lump and nipple discharge  Musculoskeletal:negative for stiff joints, neck pain and muscle weakness  Endocrine ROS: negative for - breast changes, galactorrhea or temperature intolerance  Hematological and Lymphatic ROS: negative for - blood clots, bruising or swollen lymph nodes    Physical Exam:  Visit Vitals    /70    Ht 5' 5\" (1.651 m)    Wt 181 lb (82.1 kg)    Breastfeeding Yes    BMI 30.12 kg/m2       GENERAL: alert, well appearing, and in no distress  HEAD: normocephalic, atraumatic. NEURO: alert, oriented, normal speech, tearful    Assessment:  Encounter Diagnoses   Name Primary?  Postpartum depression Yes    Anxiety disorder, unspecified type        Plan:  The patient is advised that she should contact the office if she does not note improvement or if symptoms recur  Recommend follow up with PCP for non-gynecologic complaints and chronic medical problems.     She should contact our office with any questions or concerns  She could keep her routine annual exam appointment. Mood precautions. Disc options of meds/counseling, pt considering combination  Rec healthy diet regular exercise, good support   Notify MD for SI/HI  FU 1 week check sx  I spent 15 minutes of face to face time counseling and discussing mood, depression, postpartum changes with the patient. More than 50 % of her visit was spent performing counseling. Orders Placed This Encounter    buPROPion SR (WELLBUTRIN SR) 150 mg SR tablet       No results found for this visit on 03/29/17.

## 2017-03-29 NOTE — MR AVS SNAPSHOT
Visit Information Date & Time Provider Department Dept. Phone Encounter #  
 3/29/2017 10:20 AM MD Sander Sahu [phone] [id number] Your Appointments 6/9/2017  1:10 PM  
ESTABLISHED PATIENT with MD Sander Sahu ([facility]) Appt Note: ae; ae  
 [address] [facility] [address] [address]  
[address] Upcoming Health Maintenance Date Due  
 PAP AKA CERVICAL CYTOLOGY 2/5/2017 Allergies as of 3/29/2017  Review Complete On: 3/29/2017 By: Moises Patricio LPN No Known Allergies Current Immunizations  Never Reviewed Name Date Influenza Vaccine (Quad) PF 10/31/2016 Tdap 10/31/2016 Not reviewed this visit Vitals BP Height(growth percentile) Weight(growth percentile) Breastfeeding? BMI OB Status 114/70 5' 5\" (1.651 m) 181 lb (82.1 kg) Yes 30.12 kg/m2 Recent pregnancy Smoking Status Never Smoker BMI and BSA Data Body Mass Index Body Surface Area  
 30.12 kg/m 2 1.94 m 2 Preferred Pharmacy Pharm[facility] Name Phone [facility] [address] [facility] Your Updated Medication List  
  
   
This list is accurate as of: 3/29/17 10:41 AM.  Always use your most recent med list.  
  
  
  
  
 CALCIUM PO Take  by mouth. FISH OIL PO Take  by mouth. norethindrone 0.35 mg Tab Commonly known as:  Cody & Cody Take 1 Tab by mouth daily. PRENATAL VITAMIN PO Take  by mouth. PROBIOTIC PO Take  by mouth. Patient Instructions Depression After Childbirth: Care Instructions Your Care Instructions Many women get the \"baby blues\" during the first few days after childbirth. You may lose sleep, feel irritable, and cry easily.  You may feel happy one minute and sad the next. Hormone changes are one cause of these emotional changes. Also, the demands of a new baby, along with visits from relatives or other family needs, add to a mother's stress. The \"baby blues\" often peak around the fourth day. Then they ease up in less than 2 weeks. If your moodiness or anxiety lasts for more than 2 weeks, or if you feel like life is not worth living, you may have postpartum depression. This is different for each mother. Some mothers with serious depression may worry intensely about their infant's well-being. Others may feel distant from their child. Some mothers might even feel that they might harm their baby. A mother may have signs of paranoia, wondering if someone is watching her. Depression is not a sign of weakness. It is a medical condition that requires treatment. Medicine and counseling often work well to reduce depression. Talk to your doctor about taking antidepressant medicine while breastfeeding. Follow-up care is a key part of your treatment and safety. Be sure to make and go to all appointments, and call your doctor if you are having problems. It's also a good idea to know your test results and keep a list of the medicines you take. How do you know if you are depressed? With all the changes in your life, you may not know if you are depressed. Pregnancy sometimes causes changes in how you feel that are similar to the symptoms of depression. Symptoms of depression include: · Feeling sad or hopeless and losing interest in daily activities. These are the most common symptoms of depression. · Sleeping too much or not enough. · Feeling tired. You may feel as if you have no energy. · Eating too much or too little. · Writing or talking about death, such as writing suicide notes or talking about guns, knives, or pills.  Keep the numbers for these national suicide hotlines: 1-331-023-TALK (0-703.227.8730) and 1-016-LJVQGZL (8-943.767.8238). If you or someone you know talks about suicide or feeling hopeless, get help right away. How can you care for yourself at home? · Be safe with medicines. Take your medicines exactly as prescribed. Call your doctor if you think you are having a problem with your medicine. · Eat a healthy diet so that you can keep up your energy. · Get regular daily exercise, such as walks, to help improve your mood. · Get as much sunlight as possible. Keep your shades and curtains open. Get outside as much as you can. · Avoid using alcohol or other substances to feel better. · Get as much rest and sleep as possible. Avoid doing too much. Being too tired can increase depression. · Play stimulating music throughout your day and soothing music at night. · Schedule outings and visits with friends and family. Ask them to call you regularly, so that you do not feel alone. · Ask for help with preparing food and other daily tasks. Family and friends are often happy to help a mother with a . · Be honest with yourself and those who care about you. Tell them about your struggle. · Join a support group of new mothers. No one can better understand the challenges of caring for a  than other new mothers. · If you feel like life is not worth living or are feeling hopeless, get help right away. Keep the numbers for these national suicide hotlines: 3-799-971-TALK (4-389.525.7812) and 7-911-UWEATIC (6-670.924.6541). When should you call for help? Call 911 anytime you think you may need emergency care. For example, call if: 
· You feel you cannot stop from hurting yourself, your baby, or someone else. Call your doctor now or seek immediate medical care if: 
· You are having trouble caring for yourself or your baby. · You hear voices. Watch closely for changes in your health, and be sure to contact your doctor if: 
· You have problems with your depression medicine. · You do not get better as expected. Where can you learn more? Go to http://angélica-gulshan.info/. Enter Y864 in the search box to learn more about \"Depression After Childbirth: Care Instructions. \" Current as of: July 26, 2016 Content Version: 11.2 © 7975-6963 [employer]. Care instructions adapted under license by [employer] (which disclaims liability or warranty for this information). If you have questions about a medical condition or this instruction, always ask your healthcare professional. [address] any warranty or liability for your use of this information. Introducing hospitals & HEALTH SERVICES! Dear Lasha Nava: 
Thank you for requesting a TapSurge account. Our records indicate that you already have an active TapSurge account. You can access your account anytime at [email or web address] Did you know that you can access your hospital and ER discharge instructions at any time in TapSurge? You can also review all of your test results from your hospital stay or ER visit. Additional Information If you have questions, please visit the Frequently Asked Questions section of the Tap[employer]ge website at [email or web address] Remember, TapSurge is NOT to be used for urgent needs. For medical emergencies, dial 911. Now available from your iPhone and Android! Please provide this summary of care documentation to your next provider. Your primary care clinician is listed as FALLON ALFONSO. If you have any questions after today's visit, please call [phone].

## 2017-03-29 NOTE — PATIENT INSTRUCTIONS
Depression After Childbirth: Care Instructions  Your Care Instructions  Many women get the \"baby blues\" during the first few days after childbirth. You may lose sleep, feel irritable, and cry easily. You may feel happy one minute and sad the next. Hormone changes are one cause of these emotional changes. Also, the demands of a new baby, along with visits from relatives or other family needs, add to a mother's stress. The \"baby blues\" often peak around the fourth day. Then they ease up in less than 2 weeks. If your moodiness or anxiety lasts for more than 2 weeks, or if you feel like life is not worth living, you may have postpartum depression. This is different for each mother. Some mothers with serious depression may worry intensely about their infant's well-being. Others may feel distant from their child. Some mothers might even feel that they might harm their baby. A mother may have signs of paranoia, wondering if someone is watching her. Depression is not a sign of weakness. It is a medical condition that requires treatment. Medicine and counseling often work well to reduce depression. Talk to your doctor about taking antidepressant medicine while breastfeeding. Follow-up care is a key part of your treatment and safety. Be sure to make and go to all appointments, and call your doctor if you are having problems. It's also a good idea to know your test results and keep a list of the medicines you take. How do you know if you are depressed? With all the changes in your life, you may not know if you are depressed. Pregnancy sometimes causes changes in how you feel that are similar to the symptoms of depression. Symptoms of depression include:  · Feeling sad or hopeless and losing interest in daily activities. These are the most common symptoms of depression. · Sleeping too much or not enough. · Feeling tired. You may feel as if you have no energy. · Eating too much or too little.   · Writing or talking about death, such as writing suicide notes or talking about guns, knives, or pills. Keep the numbers for these national suicide hotlines: 8-961-355-TALK (2-485.726.2591) and 4-180-TXYEAYV (9-191.152.6277). If you or someone you know talks about suicide or feeling hopeless, get help right away. How can you care for yourself at home? · Be safe with medicines. Take your medicines exactly as prescribed. Call your doctor if you think you are having a problem with your medicine. · Eat a healthy diet so that you can keep up your energy. · Get regular daily exercise, such as walks, to help improve your mood. · Get as much sunlight as possible. Keep your shades and curtains open. Get outside as much as you can. · Avoid using alcohol or other substances to feel better. · Get as much rest and sleep as possible. Avoid doing too much. Being too tired can increase depression. · Play stimulating music throughout your day and soothing music at night. · Schedule outings and visits with friends and family. Ask them to call you regularly, so that you do not feel alone. · Ask for help with preparing food and other daily tasks. Family and friends are often happy to help a mother with a . · Be honest with yourself and those who care about you. Tell them about your struggle. · Join a support group of new mothers. No one can better understand the challenges of caring for a  than other new mothers. · If you feel like life is not worth living or are feeling hopeless, get help right away. Keep the numbers for these national suicide hotlines: -TALK (1-618.797.1803) and 8-200-RILZGRY (7-817.734.4337). When should you call for help? Call 911 anytime you think you may need emergency care. For example, call if:  · You feel you cannot stop from hurting yourself, your baby, or someone else. Call your doctor now or seek immediate medical care if:  · You are having trouble caring for yourself or your baby.   · You hear voices. Watch closely for changes in your health, and be sure to contact your doctor if:  · You have problems with your depression medicine. · You do not get better as expected. Where can you learn more? Go to http://angélica-gulshan.info/. Enter B467 in the search box to learn more about \"Depression After Childbirth: Care Instructions. \"  Current as of: July 26, 2016  Content Version: 11.2  © 2174-2330 [employer], [employer]. Care instructions adapted under license by [employer] (which disclaims liability or warranty for this information). If you have questions about a medical condition or this instruction, always ask your healthcare professional. [place] [address] any warranty or liability for your use of this information.

## 2017-04-03 ENCOUNTER — TELEPHONE (OUTPATIENT)
Dept: OBGYN CLINIC | Age: 32
End: 2017-04-03

## 2017-04-03 NOTE — TELEPHONE ENCOUNTER
That is unusual for that to decrease her milk supply. I agree with inc fluid intake, longer duration and frequency of pumping: even after the milk stops coming out to send a message to make more in the future. Consider lactation consult: did she take Madhavi's class or we can refer her. I also think the hospital lactation number is on her discharge paperwork. It is her choice, but my hope would be she should be able to do both. (med and BF). She can stay on lower dose if she prefers and it makes a difference in milk supply.

## 2017-04-03 NOTE — TELEPHONE ENCOUNTER
Patient advised of MD recommendations and verbalized understanding. This nurse gave the patient the phone number for [facility]

## 2017-04-05 ENCOUNTER — OFFICE VISIT (OUTPATIENT)
Dept: OBGYN CLINIC | Age: 32
End: 2017-04-05

## 2017-04-05 VITALS
DIASTOLIC BLOOD PRESSURE: 64 MMHG | HEIGHT: 65 IN | WEIGHT: 181 LBS | BODY MASS INDEX: 30.16 KG/M2 | SYSTOLIC BLOOD PRESSURE: 112 MMHG

## 2017-04-05 DIAGNOSIS — Z71.89 ENCOUNTER FOR ANTEPARTUM CONSULTATION REGARDING LACTATION: ICD-10-CM

## 2017-04-05 DIAGNOSIS — F32.A DEPRESSION, UNSPECIFIED DEPRESSION TYPE: Primary | ICD-10-CM

## 2017-04-05 RX ORDER — BUPROPION HYDROCHLORIDE 150 MG/1
150 TABLET, EXTENDED RELEASE ORAL 2 TIMES DAILY
Qty: 90 TAB | Refills: 0 | Status: SHIPPED | OUTPATIENT
Start: 2017-04-05 | End: 2017-05-24 | Stop reason: SDUPTHER

## 2017-04-05 NOTE — PATIENT INSTRUCTIONS
Depression After Childbirth: Care Instructions  Your Care Instructions  Many women get the \"baby blues\" during the first few days after childbirth. You may lose sleep, feel irritable, and cry easily. You may feel happy one minute and sad the next. Hormone changes are one cause of these emotional changes. Also, the demands of a new baby, along with visits from relatives or other family needs, add to a mother's stress. The \"baby blues\" often peak around the fourth day. Then they ease up in less than 2 weeks. If your moodiness or anxiety lasts for more than 2 weeks, or if you feel like life is not worth living, you may have postpartum depression. This is different for each mother. Some mothers with serious depression may worry intensely about their infant's well-being. Others may feel distant from their child. Some mothers might even feel that they might harm their baby. A mother may have signs of paranoia, wondering if someone is watching her. Depression is not a sign of weakness. It is a medical condition that requires treatment. Medicine and counseling often work well to reduce depression. Talk to your doctor about taking antidepressant medicine while breastfeeding. Follow-up care is a key part of your treatment and safety. Be sure to make and go to all appointments, and call your doctor if you are having problems. It's also a good idea to know your test results and keep a list of the medicines you take. How do you know if you are depressed? With all the changes in your life, you may not know if you are depressed. Pregnancy sometimes causes changes in how you feel that are similar to the symptoms of depression. Symptoms of depression include:  · Feeling sad or hopeless and losing interest in daily activities. These are the most common symptoms of depression. · Sleeping too much or not enough. · Feeling tired. You may feel as if you have no energy. · Eating too much or too little.   · Writing or talking about death, such as writing suicide notes or talking about guns, knives, or pills. Keep the numbers for these national suicide hotlines: 4-923-455-TALK (2-689.417.6325) and 5-258-NGAPWQU (8-475.856.9478). If you or someone you know talks about suicide or feeling hopeless, get help right away. How can you care for yourself at home? · Be safe with medicines. Take your medicines exactly as prescribed. Call your doctor if you think you are having a problem with your medicine. · Eat a healthy diet so that you can keep up your energy. · Get regular daily exercise, such as walks, to help improve your mood. · Get as much sunlight as possible. Keep your shades and curtains open. Get outside as much as you can. · Avoid using alcohol or other substances to feel better. · Get as much rest and sleep as possible. Avoid doing too much. Being too tired can increase depression. · Play stimulating music throughout your day and soothing music at night. · Schedule outings and visits with friends and family. Ask them to call you regularly, so that you do not feel alone. · Ask for help with preparing food and other daily tasks. Family and friends are often happy to help a mother with a . · Be honest with yourself and those who care about you. Tell them about your struggle. · Join a support group of new mothers. No one can better understand the challenges of caring for a  than other new mothers. · If you feel like life is not worth living or are feeling hopeless, get help right away. Keep the numbers for these national suicide hotlines: -TALK (6-628.587.1907) and 3-839-FZJPFFP (3-891.245.2924). When should you call for help? Call 911 anytime you think you may need emergency care. For example, call if:  · You feel you cannot stop from hurting yourself, your baby, or someone else. Call your doctor now or seek immediate medical care if:  · You are having trouble caring for yourself or your baby.   · You hear voices. Watch closely for changes in your health, and be sure to contact your doctor if:  · You have problems with your depression medicine. · You do not get better as expected. Where can you learn more? Go to http://angélica-gulshan.info/. Enter A524 in the search box to learn more about \"Depression After Childbirth: Care Instructions. \"  Current as of: July 26, 2016  Content Version: 11.2  © 3671-9093 [employer], [employer]. Care instructions adapted under license by [employer] (which disclaims liability or warranty for this information). If you have questions about a medical condition or this instruction, always ask your healthcare professional. [place] [address] any warranty or liability for your use of this information.

## 2017-04-05 NOTE — PROGRESS NOTES
[address] [facility]  [email or web address]    Segundo Reed MD, [address]       OB/GYN Follow-up visit    Chief Complaint: Follow up visit  Chief Complaint   Patient presents with    Follow-up     postpartum depression         History of Present Illness: This is a follow up visit from 3/29/17. She is having a follow up for postpartum depression. The patient reports that she has felt much better since starting the wellbutrin, and has been sleeping more. Patient reports that her mild supply has decreased, and sometimes when she pumps she does not get any milk which is causing her some stress and anxiety. She reports the symptoms are has improved. Aggravating factors include none. Alleviating factors include Wellbutrin. She does not have other concerns. LMP: No LMP recorded. PFSH:  Past Medical History:   Diagnosis Date    Asthma     Fibrocystic breast disease 3/2012    Pap smear for cervical cancer screening 02/05/2016    negative    PCOS (polycystic ovarian syndrome)     Polycystic disease, ovaries      No past surgical history on file. Family History   Problem Relation Age of Onset    Other Sister      PCOS    Thyroid Disease Sister      hypothyroid, prolactinoma    Breast Cancer Paternal Aunt     Colon Cancer Maternal Grandfather     Cancer Paternal Grandmother      melanoma    Diabetes Paternal Grandfather     Heart Disease Paternal Grandfather     Diabetes Father     No Known Problems Mother      Social History   Substance Use Topics    Smoking status: Never Smoker    Smokeless tobacco: Never Used    Alcohol use No     No Known Allergies  Current Outpatient Prescriptions   Medication Sig    buPROPion SR (WELLBUTRIN SR) 150 mg SR tablet Take 1 Tab by mouth two (2) times a day.  CALCIUM PO Take  by mouth.  PRENATAL VIT W-CA,FE,FA,<1 MG, (PRENATAL VITAMIN PO) Take  by mouth.  DOCOSAHEXANOIC ACID/EPA (FISH OIL PO) Take  by mouth.     LACTOBACILLUS ACIDOPHILUS (PROBIOTIC PO) Take  by mouth.  norethindrone (MICRONOR) 0.35 mg tab Take 1 Tab by mouth daily. No current facility-administered medications for this visit. Review of Systems:  History obtained from the patient  Constitutional:see HPI  ENT ROS: negative for - hearing change, oral lesions or visual changes  Respiratory: negative for cough, wheezing or dyspnea on exertion  Cardiovascular: negative for chest pain, irregular heart beats, exertional chest pressure/discomfort  Gastrointestinal: negative for dysphagia, nausea and vomiting  Genito-Urinary ROS: no dysuria, trouble voiding, or hematuria  Inteument/breast: negative for rash, breast lump and nipple discharge  Musculoskeletal:negative for stiff joints, neck pain and muscle weakness  Endocrine ROS: negative for - breast changes, galactorrhea or temperature intolerance  Hematological and Lymphatic ROS: negative for - blood clots, bruising or swollen lymph nodes      Physical Exam:  Visit Vitals    /64    Ht 5' 5\" (1.651 m)    Wt 181 lb (82.1 kg)    Breastfeeding Yes    BMI 30.12 kg/m2       GENERAL: alert, well appearing, and in no distress  PULM: clear to auscultation, no wheezes, rales or rhonchi, symmetric air entry   COR: normal rate and regular rhythm, S1 and S2 normal   ABDOMEN: soft, nontender, nondistended, no masses or organomegaly   NEURO: alert, oriented, normal speech  EXT no c/t/e    Assessment:  Encounter Diagnoses   Name Primary?  Encounter for antepartum consultation regarding lactation     Depression, unspecified depression type Yes       Plan:  The patient is advised that she should contact the office with any questions or concerns. She should make her routine annual gynecologic appointment if needed.   Disc supply and demand with BF: inc fluid intake, and calories homa with exercise  Continue meds until fu /AE 2 months  Disc risks of combining wellbutrin and etoh  Mood precautions    Orders Placed This Encounter    buPROPion SR (WELLBUTRIN SR) 150 mg SR tablet       No results found for this visit on 04/05/17.     Ivory Lopez MD

## 2017-04-05 NOTE — MR AVS SNAPSHOT
Visit Information Date & Time Provider Department Dept. Phone Encounter #  
 4/5/2017  3:10 PM Natividad Sutherland MD [facility] 90 [id number] Your Appointments 6/9/2017  1:10 PM  
ESTABLISHED PATIENT with Natividad Sutherland MD  
[place] ([address]) Appt Note: ae; ae  
 [address] [address]  
[address] Upcoming Health Maintenance Date Due  
 PAP AKA CERVICAL CYTOLOGY 2/5/2017 Allergies as of 4/5/2017  Review Complete On: 4/5/2017 By: Tania Tejeda LPN No Known Allergies Current Immunizations  Never Reviewed Name Date Influenza Vaccine (Quad) PF 10/31/2016 Tdap 10/31/2016 Not reviewed this visit Vitals BP Height(growth percentile) Weight(growth percentile) Breastfeeding? BMI OB Status 112/64 5' 5\" (1.651 m) 181 lb (82.1 kg) Yes 30.12 kg/m2 Recent pregnancy Smoking Status Never Smoker BMI and BSA Data Body Mass Index Body Surface Area  
 30.12 kg/m 2 1.94 m 2 Preferred Pharmacy Pharmacy Name Phone [facility], [address] [facility] Your Updated Medication List  
  
   
This list is accurate as of: 4/5/17  3:37 PM.  Always use your most recent med list.  
  
  
  
  
 buPROPion  mg SR tablet Commonly known as:  Fayette Olszewski Take 1 Tab by mouth two (2) times a day. Start with one tablet po qam x 3 days CALCIUM PO Take  by mouth. FISH OIL PO Take  by mouth. norethindrone 0.35 mg Tab Commonly known as:  Cody & Cody Take 1 Tab by mouth daily. PRENATAL VITAMIN PO Take  by mouth. PROBIOTIC PO Take  by mouth. Patient Instructions Depression After Childbirth: Care Instructions Your Care Instructions Many women get the \"baby blues\" during the first few days after childbirth. You may lose sleep, feel irritable, and cry easily. You may feel happy one minute and sad the next. Hormone changes are one cause of these emotional changes. Also, the demands of a new baby, along with visits from relatives or other family needs, add to a mother's stress. The \"baby blues\" often peak around the fourth day. Then they ease up in less than 2 weeks. If your moodiness or anxiety lasts for more than 2 weeks, or if you feel like life is not worth living, you may have postpartum depression. This is different for each mother. Some mothers with serious depression may worry intensely about their infant's well-being. Others may feel distant from their child. Some mothers might even feel that they might harm their baby. A mother may have signs of paranoia, wondering if someone is watching her. Depression is not a sign of weakness. It is a medical condition that requires treatment. Medicine and counseling often work well to reduce depression. Talk to your doctor about taking antidepressant medicine while breastfeeding. Follow-up care is a key part of your treatment and safety. Be sure to make and go to all appointments, and call your doctor if you are having problems. It's also a good idea to know your test results and keep a list of the medicines you take. How do you know if you are depressed? With all the changes in your life, you may not know if you are depressed. Pregnancy sometimes causes changes in how you feel that are similar to the symptoms of depression. Symptoms of depression include: · Feeling sad or hopeless and losing interest in daily activities. These are the most common symptoms of depression. · Sleeping too much or not enough. · Feeling tired. You may feel as if you have no energy. · Eating too much or too little.  
· Writing or talking about death, such as writing suicide notes or talking about guns, knives, or pills. Keep the numbers for these national suicide hotlines: 0-266-556-TALK (9-821-818-990.431.1190) and 5-729-PVPCKQF (1-397.112.3357). If you or someone you know talks about suicide or feeling hopeless, get help right away. How can you care for yourself at home? · Be safe with medicines. Take your medicines exactly as prescribed. Call your doctor if you think you are having a problem with your medicine. · Eat a healthy diet so that you can keep up your energy. · Get regular daily exercise, such as walks, to help improve your mood. · Get as much sunlight as possible. Keep your shades and curtains open. Get outside as much as you can. · Avoid using alcohol or other substances to feel better. · Get as much rest and sleep as possible. Avoid doing too much. Being too tired can increase depression. · Play stimulating music throughout your day and soothing music at night. · Schedule outings and visits with friends and family. Ask them to call you regularly, so that you do not feel alone. · Ask for help with preparing food and other daily tasks. Family and friends are often happy to help a mother with a . · Be honest with yourself and those who care about you. Tell them about your struggle. · Join a support group of new mothers. No one can better understand the challenges of caring for a  than other new mothers. · If you feel like life is not worth living or are feeling hopeless, get help right away. Keep the numbers for these national suicide hotlines: 1-440-271-TALK (4-444.792.9183) and 9-588-CSMEUKP (0-135-962-421.123.8135). When should you call for help? Call 911 anytime you think you may need emergency care. For example, call if: 
· You feel you cannot stop from hurting yourself, your baby, or someone else. Call your doctor now or seek immediate medical care if: 
· You are having trouble caring for yourself or your baby. · You hear voices. Watch closely for changes in your health, and be sure to contact your doctor if: 
· You have problems with your depression medicine. · You do not get better as expected. Where can you learn more? Go to http://angélica-gulshan.info/. Enter I509 in the search box to learn more about \"Depression After Childbirth: Care Instructions. \" Current as of: July 26, 2016 Content Version: 11.2 © 0519-3152 MeetLinkshare. Care instructions adapted under license by [employer] (which disclaims liability or warranty for this information). If you have questions about a medical condition or this instruction, always ask your healthcare professional. [address] any warranty or liability for your use of this information. Introducing [facility]! Dear Rusty Juarez: 
Thank you for requesting a Mompery account. Our records indicate that you already have an active Mompery account. You can access your account anytime at [email or web address] Did you know that you can access your hospital and ER discharge instructions at any time in Mompery? You can also review all of your test results from your hospital stay or ER visit. Additional Information If you have questions, please visit the Frequently Asked Questions section of the Mompery website at [email or web address] Remember, Mompery is NOT to be used for urgent needs. For medical emergencies, dial 911. Now available from your iPhone and Android! Please provide this summary of care documentation to your next provider. Your primary care clinician is listed as FALLON ALFONSO. If you have any questions after today's visit, please call [phone].

## 2017-04-28 ENCOUNTER — TELEPHONE (OUTPATIENT)
Dept: OBGYN CLINIC | Age: 32
End: 2017-04-28

## 2017-04-28 NOTE — TELEPHONE ENCOUNTER
Message left at 10:17am      28year old patient last seen in the office on 4/5/17. Patient has follow up appointment on 6/9/17. Patient calling about a refill on her Wellbutrin SR. Patient states she only has two days left. This nurse called the [facility] pharmacy on [address] and was advised of prescription on hold and that they will send to patient preferred pharmacy at [facility]. This nurse advised patient of available refill at her preferred pharmacy. Patient verbalized understanding.

## 2017-05-24 ENCOUNTER — TELEPHONE (OUTPATIENT)
Dept: OBGYN CLINIC | Age: 32
End: 2017-05-24

## 2017-05-24 RX ORDER — BUPROPION HYDROCHLORIDE 150 MG/1
150 TABLET, EXTENDED RELEASE ORAL 2 TIMES DAILY
Qty: 180 TAB | Refills: 0 | Status: SHIPPED | OUTPATIENT
Start: 2017-05-24 | End: 2017-07-07 | Stop reason: SDUPTHER

## 2017-05-24 NOTE — TELEPHONE ENCOUNTER
This nurse called the pharmacy and advised that prescription sent as per MD order. Pharmacy verbalized understanding.

## 2017-05-24 NOTE — TELEPHONE ENCOUNTER
Call received at 10:15am    TP 28year old patient last seen in the office on 4/5/17. Patient given a prescription for Wellbutrin SR on 4/5/17. Pharmacy calling to request a 90 day supply ( 180 quantity , takes 2 times a day) for the Wellbutrin. ?ok to refill. Please advise            4/5/17  Plan:  The patient is advised that she should contact the office with any questions or concerns. She should make her routine annual gynecologic appointment if needed. Disc supply and demand with BF: inc fluid intake, and calories homa with exercise  Continue meds until fu /AE 2 months  Disc risks of combining wellbutrin and etoh  Mood precautions    Patient has appointment for 6/9/17 for follow up.

## 2017-07-07 ENCOUNTER — OFFICE VISIT (OUTPATIENT)
Dept: OBGYN CLINIC | Age: 32
End: 2017-07-07

## 2017-07-07 VITALS
WEIGHT: 178 LBS | HEIGHT: 65 IN | BODY MASS INDEX: 29.66 KG/M2 | SYSTOLIC BLOOD PRESSURE: 108 MMHG | DIASTOLIC BLOOD PRESSURE: 70 MMHG

## 2017-07-07 DIAGNOSIS — F41.9 ANXIETY: ICD-10-CM

## 2017-07-07 DIAGNOSIS — E28.2 POLYCYSTIC OVARIES: ICD-10-CM

## 2017-07-07 DIAGNOSIS — Z01.411 ENCOUNTER FOR GYNECOLOGICAL EXAMINATION (GENERAL) (ROUTINE) WITH ABNORMAL FINDINGS: Primary | ICD-10-CM

## 2017-07-07 DIAGNOSIS — R53.83 FATIGUE, UNSPECIFIED TYPE: ICD-10-CM

## 2017-07-07 RX ORDER — BUPROPION HYDROCHLORIDE 150 MG/1
150 TABLET, EXTENDED RELEASE ORAL 2 TIMES DAILY
Qty: 180 TAB | Refills: 1 | Status: SHIPPED | OUTPATIENT
Start: 2017-07-07 | End: 2021-04-12

## 2017-07-07 NOTE — PATIENT INSTRUCTIONS

## 2017-07-07 NOTE — MR AVS SNAPSHOT
Visit Information Date & Time Provider Department Dept. Phone Encounter #  
 7/7/2017  1:00 PM Joselyn Kussmaul, MD [place] [phone] [id number] Upcoming Health Maintenance Date Due  
 PAP AKA CERVICAL CYTOLOGY 2/5/2017 INFLUENZA AGE 9 TO ADULT 8/1/2017 Allergies as of 7/7/2017  Review Complete On: 7/7/2017 By: Nena Cruz LPN No Known Allergies Current Immunizations  Never Reviewed Name Date Influenza Vaccine (Quad) PF 10/31/2016 Tdap 10/31/2016 Not reviewed this visit Vitals BP Height(growth percentile) Weight(growth percentile) Breastfeeding? BMI OB Status 108/70 5' 5\" (1.651 m) 178 lb (80.7 kg) Yes 29.62 kg/m2 Recent pregnancy Smoking Status Never Smoker BMI and BSA Data Body Mass Index Body Surface Area  
 29.62 kg/m 2 1.92 m 2 Preferred Pharmacy Pharmacy Name Phone [address] [phone] Your Updated Medication List  
  
   
This list is accurate as of: 7/7/17  1:46 PM.  Always use your most recent med list.  
  
  
  
  
 buPROPion  mg SR tablet Commonly known as:  Silvio Nova Take 1 Tab by mouth two (2) times a day. CALCIUM PO Take  by mouth. FISH OIL PO Take  by mouth. norethindrone 0.35 mg Tab Commonly known as:  Cody & Cody Take 1 Tab by mouth daily. PRENATAL VITAMIN PO Take  by mouth. PROBIOTIC PO Take  by mouth. Patient Instructions Well Visit, Ages 25 to 48: Care Instructions Your Care Instructions Physical exams can help you stay healthy. Your doctor has checked your overall health and may have suggested ways to take good care of yourself. He or she also may have recommended tests. At home, you can help prevent illness with healthy eating, regular exercise, and other steps. Follow-up care is a key part of your treatment and safety. Be sure to make and go to all appointments, and call your doctor if you are having problems. It's also a good idea to know your test results and keep a list of the medicines you take. How can you care for yourself at home? · Reach and stay at a healthy weight. This will lower your risk for many problems, such as obesity, diabetes, heart disease, and high blood pressure. · Get at least 30 minutes of physical activity on most days of the week. Walking is a good choice. You also may want to do other activities, such as running, swimming, cycling, or playing tennis or team sports. Discuss any changes in your exercise program with your doctor. · Do not smoke or allow others to smoke around you. If you need help quitting, talk to your doctor about stop-smoking programs and medicines. These can increase your chances of quitting for good. · Talk to your doctor about whether you have any risk factors for sexually transmitted infections (STIs). Having one sex partner (who does not have STIs and does not have sex with anyone else) is a good way to avoid these infections. · Use birth control if you do not want to have children at this time. Talk with your doctor about the choices available and what might be best for you. · Protect your skin from too much sun. When you're outdoors from 10 a.m. to 4 p.m., stay in the shade or cover up with clothing and a hat with a wide brim. Wear sunglasses that block UV rays. Even when it's cloudy, put broad-spectrum sunscreen (SPF 30 or higher) on any exposed skin. · See a dentist one or two times a year for checkups and to have your teeth cleaned. · Wear a seat belt in the car. · Drink alcohol in moderation, if at all. That means no more than 2 drinks a day for men and 1 drink a day for women. Follow your doctor's advice about when to have certain tests. These tests can spot problems early. For everyone · Cholesterol. Have the fat (cholesterol) in your blood tested after age 21. Your doctor will tell you how often to have this done based on your age, family history, or other things that can increase your risk for heart disease. · Blood pressure. Have your blood pressure checked during a routine doctor visit. Your doctor will tell you how often to check your blood pressure based on your age, your blood pressure results, and other factors. · Vision. Talk with your doctor about how often to have a glaucoma test. 
· Diabetes. Ask your doctor whether you should have tests for diabetes. · Colon cancer. Have a test for colon cancer at age 48. You may have one of several tests. If you are younger than 48, you may need a test earlier if you have any risk factors. Risk factors include whether you already had a precancerous polyp removed from your colon or whether your parent, brother, sister, or child has had colon cancer. For women · Breast exam and mammogram. Talk to your doctor about when you should have a clinical breast exam and a mammogram. Medical experts differ on whether and how often women under 50 should have these tests. Your doctor can help you decide what is right for you. · Pap test and pelvic exam. Begin Pap tests at age 24. A Pap test is the best way to find cervical cancer. The test often is part of a pelvic exam. Ask how often to have this test. 
· Tests for sexually transmitted infections (STIs). Ask whether you should have tests for STIs. You may be at risk if you have sex with more than one person, especially if your partners do not wear condoms. For men · Tests for sexually transmitted infections (STIs). Ask whether you should have tests for STIs. You may be at risk if you have sex with more than one person, especially if you do not wear a condom. · Testicular cancer exam. Ask your doctor whether you should check your testicles regularly. · Prostate exam. Talk to your doctor about whether you should have a blood test (called a PSA test) for prostate cancer. Experts differ on whether and when men should have this test. Some experts suggest it if you are older than 39 and are -American or have a father or brother who got prostate cancer when he was younger than 72. When should you call for help? Watch closely for changes in your health, and be sure to contact your doctor if you have any problems or symptoms that concern you. Where can you learn more? Go to http://angélica-gulshan.info/. Enter P072 in the search box to learn more about \"Well Visit, Ages 25 to 48: Care Instructions. \" Current as of: July 19, 2016 Content Version: 11.3 © 7262-8930 Sparxent. Care instructions adapted under license by [employer] (which disclaims liability or warranty for this information). If you have questions about a medical condition or this instruction, always ask your healthcare professional. [place] [address] any warranty or liability for your use of this information. Introducing [facility]! Dear Karol Gan: 
Thank you for requesting a Everyday Solutions account. Our records indicate that you already have an active Everyday Solutions account. You can access your account anytime at [email or web address] Did you know that you can access your hospital and ER discharge instructions at any time in Everyday Solutions? You can also review all of your test results from your hospital stay or ER visit. Additional Information If you have questions, please visit the Frequently Asked Questions section of the Everyday Solutions website at [email or web address] Remember, Everyday Solutions is NOT to be used for urgent needs. For medical emergencies, dial 911. Now available from your iPhone and Android! Please provide this summary of care documentation to your next provider. Your primary care clinician is listed as Kris Madison. If you have any questions after today's visit, please call [phone].

## 2017-07-07 NOTE — PROGRESS NOTES
[facility]  [email or web address]  [phone]    Deepak De Leon MD, FACOG       Annual Gynecologic Exam:  WWE <40  Chief Complaint   Patient presents with    Well Woman         Pasha Santos is a 28 y.o.  [facility] female who presents for an annual well woman exam.  No LMP recorded. .    Had mood change when returned to work but feels good now.    +pumping/BF    With regard to the Gardisil vaccine, she is older than the FDA approved age to receive it. She does not report additional concerns today. Menstrual status:  Her periods are none due to BF. She does not report dysmenorrhea/painful menses. She does not report irregular bleeding. Sexual history and Contraception:  History   Sexual Activity    Sexual activity: Yes    Partners: Male    Birth control/ protection: None     She always use condoms with sexual activity  She does not reports new sexual partner(s) in the last year. The patient does not request STD testing. We recommended testing per CDC guidelines and at patient request.     Preventive Medicine History:  Her last annual GYN exam was about one year ago. Her most recent Pap smear result: normal was obtained in 2016. Her most recent HR HPV screen was not obtained due to age protocol. She does not have a history of SANTIAGO 2, 3 or cervical cancer. Past Medical History:   Diagnosis Date    Asthma     Fibrocystic breast disease 3/2012    Pap smear for cervical cancer screening 2016    negative    PCOS (polycystic ovarian syndrome)     Polycystic disease, ovaries      OB History    Para Term  AB Living   1 1 1 0 0 1   SAB TAB Ectopic Molar Multiple Live Births   0 0 0  0 1      # Outcome Date GA Lbr Pino/2nd Weight Sex Delivery Anes PTL Lv   1 Term 01/15/17 40w2d 08:37 / 00:49 8 lb 1.3 oz (3.665 kg) M Vag-Spont EPIDURAL AN N LAURA        No past surgical history on file.   Family History   Problem Relation Age of Onset    Other Sister      PCOS    Thyroid Disease Sister      hypothyroid, prolactinoma    Breast Cancer Paternal Aunt     Colon Cancer Maternal Grandfather     Cancer Paternal Grandmother      melanoma    Diabetes Paternal Grandfather     Heart Disease Paternal Grandfather     Diabetes Father     No Known Problems Mother      Social History     Social History    Marital status:      Spouse name: N/A    Number of children: N/A    Years of education: N/A     Occupational History    Not on file. Social History Main Topics    Smoking status: Never Smoker    Smokeless tobacco: Never Used    Alcohol use No    Drug use: No    Sexual activity: Yes     Partners: Male     Birth control/ protection: None     Other Topics Concern    Not on file     Social History Narrative       No Known Allergies    Current Outpatient Prescriptions   Medication Sig    buPROPion SR (WELLBUTRIN SR) 150 mg SR tablet Take 1 Tab by mouth two (2) times a day.  PRENATAL VIT W-CA,FE,FA,<1 MG, (PRENATAL VITAMIN PO) Take  by mouth.  DOCOSAHEXANOIC ACID/EPA (FISH OIL PO) Take  by mouth.  LACTOBACILLUS ACIDOPHILUS (PROBIOTIC PO) Take  by mouth. No current facility-administered medications for this visit.         Patient Active Problem List   Diagnosis Code    Polycystic ovaries E28.2    Supervision of normal first pregnancy Z34.00    Pregnancy Z33.1       Review of Systems - History obtained from the patient  Constitutional: negative for weight loss, fever, night sweats  HEENT: negative for hearing loss, earache, congestion, snoring, sorethroat  CV: negative for chest pain, palpitations, edema  Resp: negative for cough, shortness of breath, wheezing  GI: negative for change in bowel habits, abdominal pain, black or bloody stools  : negative for frequency, dysuria, hematuria  GYN: see HPI  MSK: negative for back pain, joint pain, muscle pain  Breast: negative for breast lumps, nipple discharge, galactorrhea  Skin :negative for itching, rash, hives  Neuro: negative for dizziness, headache, confusion, weakness  Psych: negative for anxiety, depression, change in mood  Heme/lymph: negative for bleeding, bruising, pallor    Physical Exam  Visit Vitals    /70    Ht 5' 5\" (1.651 m)    Wt 178 lb (80.7 kg)    Breastfeeding Yes    BMI 29.62 kg/m2       Constitutional  · Appearance: well-nourished, well developed, alert, in no acute distress    HENT  · Head and Face: appears normal    Neck  · Inspection/Palpation: normal appearance, no masses or tenderness  · Lymph Nodes: no lymphadenopathy present  · Thyroid: gland size normal, nontender, no nodules or masses present on palpation    Chest  · Respiratory Effort: breathing labored  · Auscultation: normal breath sounds    Cardiovascular  · Heart:  · Auscultation: regular rate and rhythm without murmur    Breasts  · Inspection of Breasts: breasts symmetrical, no skin changes, no discharge present, nipple appearance normal, no skin retraction present  · Palpation of Breasts and Axillae: no masses present on palpation, no breast tenderness  · Axillary Lymph Nodes: no lymphadenopathy present    Gastrointestinal  · Abdominal Examination: abdomen non-tender to palpation, normal bowel sounds, no masses present  · Liver and spleen: no hepatomegaly present, spleen not palpable  · Hernias: no hernias identified    Genitourinary  · External Genitalia: normal appearance for age, no discharge present, no tenderness present, no inflammatory lesions present, no masses present  · Vagina: normal vaginal vault without central or paravaginal defects, no discharge present, no inflammatory lesions present, no masses present  · Bladder: non-tender to palpation  · Urethra: appears normal  · Cervix: normal   · Uterus: normal size, shape and consistency  · Adnexa: no adnexal tenderness present, no adnexal masses present  · Perineum: perineum within normal limits, no evidence of trauma, no rashes or skin lesions present  · Anus: anus within normal limits, no hemorrhoids present  · Inguinal Lymph Nodes: no lymphadenopathy present    Skin  · General Inspection: no rash, no lesions identified    Neurologic/Psychiatric  · Mental Status:  · Orientation: grossly oriented to person, place and time  · Mood and Affect: mood normal, affect appropriate    Assessment:  28 y.o.  for well woman exam  Encounter Diagnoses   Name Primary?  Fatigue, unspecified type     Polycystic ovaries     Encounter for gynecological examination (general) (routine) with abnormal findings Yes    Postpartum depression     Anxiety        Plan:  The patient was counseled about diet, exercise, healthy lifestyle  We discussed self breast exam  We discussed safer sex practices, condom use and risk factors for sexually transmitted diseases. We discussed current pap smear and HR HPV testing guidelines. We recommend follow up one year for routine annual gynecologic exam or sooner prn  We recommend routine follow up with her primary care doctor for management of chronic medical problems and non-gynecologic concerns  Handouts were given to the patient  We discussed calcium/vitamin D/weight bearing exercise and osteoporosis prevention  Mood fu 6 mos  Disc rba of Wellbutrin, pt wants to continue    We discussed progesterone only and non hormonal options for contraception including but not limited to condoms, IUDs, Nexplanon, and depo provera. Pt declines      Folllow up:  [x] return for annual well woman exam in one year or sooner if she is having problems  [] follow up and ultrasound  [] 6 months  [] 3 months  [] 6 weeks   [] 1 month    Orders Placed This Encounter    TSH REFLEX TO T4    CBC W/O DIFF    VITAMIN D, 25 HYDROXY    buPROPion SR (WELLBUTRIN SR) 150 mg SR tablet       No results found for any visits on 17.

## 2017-07-08 LAB
25(OH)D3+25(OH)D2 SERPL-MCNC: 37.3 NG/ML (ref 30–100)
ERYTHROCYTE [DISTWIDTH] IN BLOOD BY AUTOMATED COUNT: 13.7 % (ref 12.3–15.4)
HCT VFR BLD AUTO: 39.7 % (ref 34–46.6)
HGB BLD-MCNC: 13.4 G/DL (ref 11.1–15.9)
MCH RBC QN AUTO: 27.6 PG (ref 26.6–33)
MCHC RBC AUTO-ENTMCNC: 33.8 G/DL (ref 31.5–35.7)
MCV RBC AUTO: 82 FL (ref 79–97)
PLATELET # BLD AUTO: 284 X10E3/UL (ref 150–379)
RBC # BLD AUTO: 4.85 X10E6/UL (ref 3.77–5.28)
TSH SERPL DL<=0.005 MIU/L-ACNC: 0.4 UIU/ML (ref 0.45–4.5)
WBC # BLD AUTO: 8 X10E3/UL (ref 3.4–10.8)

## 2017-07-08 NOTE — PROGRESS NOTES
Abnormal results. Please notify pt. No anemia, normal vit D, but TSH abnl (do we have T4 results yet?)  Rec fu with PCP for additional thyroid evaluation.

## 2017-07-10 ENCOUNTER — TELEPHONE (OUTPATIENT)
Dept: OBGYN CLINIC | Age: 32
End: 2017-07-10

## 2017-07-10 NOTE — TELEPHONE ENCOUNTER
Patient called to discuss her results given thru [address] Results given per MD recommendation. She verbalized understanding.

## 2017-07-10 NOTE — PROGRESS NOTES
Patient notified of abnormal results by [employer] message and was Read by Manual Brought at 7/9/2017 10:20 PM.  Still awaiting T4 results.

## 2017-07-14 ENCOUNTER — TELEPHONE (OUTPATIENT)
Dept: OBGYN CLINIC | Age: 32
End: 2017-07-14

## 2017-07-14 NOTE — TELEPHONE ENCOUNTER
Call received at 9:54am      28year old year old patient last seen in the office on 7/7/17. Patient calling to get the results of her T4 lab. This nurse spoke to Dr. Bobby Capps nurse who states she will call the lab and will send the patient a My Chart message. Patient was advised of above and verbalized understanding.

## 2017-08-21 ENCOUNTER — TELEPHONE (OUTPATIENT)
Dept: OBGYN CLINIC | Age: 32
End: 2017-08-21

## 2020-05-04 NOTE — TELEPHONE ENCOUNTER
AUDIOGRAM     Jeffry Medina was referred for testing by Cristina Pond.  12/20/1941  [id number]      Otoscopic inspection: right ear no cerumen; left ear no cerumen.        Tests/Procedures  Patient was tested via  standard insert earphones and a bone oscilla Call received at 9:05 am      28year old patient last seen in the office on 3/29/17 for postpartum depression. Patient calling to say that she has felt really good on the Wellbutrin SR but that she feels like she needs to stop taking it because she wants to breast feed. Patient reports noticing a drop in milk production. Patient reports getting up to twice a day yesterday and now today when she pumps she is not getting anything and she usually gets 3 oz in the am.Patient states she has increased her fluid intake. Patient states she has not taken the am dose of Wellbutrin.  Please advise

## 2020-09-08 LAB
ANTIBODY SCREEN, EXTERNAL: NEGATIVE
CHLAMYDIA, EXTERNAL: NEGATIVE
HBSAG, EXTERNAL: NEGATIVE
HIV, EXTERNAL: NORMAL
N. GONORRHEA, EXTERNAL: NEGATIVE
RPR, EXTERNAL: NORMAL
RUBELLA, EXTERNAL: NORMAL
TYPE, ABO & RH, EXTERNAL: NORMAL

## 2021-01-29 LAB — GTT, 1 HR, GLUCOLA, EXTERNAL: 106

## 2021-03-17 LAB — GRBS, EXTERNAL: NEGATIVE

## 2021-04-02 ENCOUNTER — HOSPITAL ENCOUNTER (OUTPATIENT)
Dept: LAB | Age: 36
Discharge: HOME | End: 2021-04-02
Payer: COMMERCIAL

## 2021-04-02 ENCOUNTER — TRANSCRIBE ORDER (OUTPATIENT)
Dept: REGISTRATION | Age: 36
End: 2021-04-02

## 2021-04-02 DIAGNOSIS — Z01.812 PRE-PROCEDURAL LABORATORY EXAMINATIONS: ICD-10-CM

## 2021-04-02 DIAGNOSIS — Z01.812 PRE-PROCEDURAL LABORATORY EXAMINATIONS: Primary | ICD-10-CM

## 2021-04-02 PROCEDURE — U0003 INFECTIOUS AGENT DETECTION BY NUCLEIC ACID (DNA OR RNA); SEVERE ACUTE RESPIRATORY SYNDROME CORONAVIRUS 2 (SARS-COV-2) (CORONAVIRUS DISEASE [COVID-19]), AMPLIFIED PROBE TECHNIQUE, MAKING USE OF HIGH THROUGHPUT TECHNOLOGIES AS DESCRIBED BY CMS-2020-01-R: HCPCS

## 2021-04-03 LAB — SARS-COV-2, COV2NT: NOT DETECTED

## 2021-04-11 ENCOUNTER — HOSPITAL ENCOUNTER (INPATIENT)
Age: 36
LOS: 1 days | Discharge: HOME | End: 2021-04-12
Attending: EMERGENCY MEDICINE | Admitting: OBSTETRICS & GYNECOLOGY
Payer: COMMERCIAL

## 2021-04-11 PROBLEM — Z34.90 TERM PREGNANCY: Status: ACTIVE | Noted: 2021-04-11

## 2021-04-11 LAB
ABO + RH BLD: NORMAL
BLOOD GROUP ANTIBODIES SERPL: NORMAL
ERYTHROCYTE [DISTWIDTH] IN BLOOD BY AUTOMATED COUNT: 13.2 % (ref 11.5–14.5)
HCT VFR BLD AUTO: 35.8 % (ref 35–47)
HGB BLD-MCNC: 12.1 G/DL (ref 11.5–16)
MCH RBC QN AUTO: 30.6 PG (ref 26–34)
MCHC RBC AUTO-ENTMCNC: 33.8 G/DL (ref 30–36.5)
MCV RBC AUTO: 90.6 FL (ref 80–99)
NRBC # BLD: 0 K/UL (ref 0–0.01)
NRBC BLD-RTO: 0 PER 100 WBC
PLATELET # BLD AUTO: 207 K/UL (ref 150–400)
PMV BLD AUTO: 9 FL (ref 8.9–12.9)
RBC # BLD AUTO: 3.95 M/UL (ref 3.8–5.2)
SPECIMEN EXP DATE BLD: NORMAL
WBC # BLD AUTO: 8.9 K/UL (ref 3.6–11)

## 2021-04-11 PROCEDURE — 75410000003 HC RECOV DEL/VAG/CSECN EA 0.5 HR: Performed by: OBSTETRICS & GYNECOLOGY

## 2021-04-11 PROCEDURE — 2709999900 HC NON-CHARGEABLE SUPPLY

## 2021-04-11 PROCEDURE — 36415 COLL VENOUS BLD VENIPUNCTURE: CPT

## 2021-04-11 PROCEDURE — 75810000275 HC EMERGENCY DEPT VISIT NO LEVEL OF CARE

## 2021-04-11 PROCEDURE — 75410000000 HC DELIVERY VAGINAL/SINGLE: Performed by: OBSTETRICS & GYNECOLOGY

## 2021-04-11 PROCEDURE — 74011250636 HC RX REV CODE- 250/636: Performed by: OBSTETRICS & GYNECOLOGY

## 2021-04-11 PROCEDURE — 99283 EMERGENCY DEPT VISIT LOW MDM: CPT

## 2021-04-11 PROCEDURE — 74011000250 HC RX REV CODE- 250

## 2021-04-11 PROCEDURE — 75410000002 HC LABOR FEE PER 1 HR: Performed by: OBSTETRICS & GYNECOLOGY

## 2021-04-11 PROCEDURE — 65270000029 HC RM PRIVATE

## 2021-04-11 PROCEDURE — 74011250637 HC RX REV CODE- 250/637: Performed by: OBSTETRICS & GYNECOLOGY

## 2021-04-11 PROCEDURE — 86901 BLOOD TYPING SEROLOGIC RH(D): CPT

## 2021-04-11 PROCEDURE — 85027 COMPLETE CBC AUTOMATED: CPT

## 2021-04-11 PROCEDURE — 0UQGXZZ REPAIR VAGINA, EXTERNAL APPROACH: ICD-10-PCS | Performed by: OBSTETRICS & GYNECOLOGY

## 2021-04-11 RX ORDER — IBUPROFEN 800 MG/1
800 TABLET ORAL EVERY 8 HOURS
Status: DISCONTINUED | OUTPATIENT
Start: 2021-04-11 | End: 2021-04-12 | Stop reason: HOSPADM

## 2021-04-11 RX ORDER — ZINC GLUCONATE 10 MG
LOZENGE ORAL
COMMUNITY

## 2021-04-11 RX ORDER — ACETAMINOPHEN 325 MG/1
650 TABLET ORAL
Status: DISCONTINUED | OUTPATIENT
Start: 2021-04-11 | End: 2021-04-12 | Stop reason: HOSPADM

## 2021-04-11 RX ORDER — ACETAMINOPHEN 500 MG
TABLET ORAL DAILY
COMMUNITY

## 2021-04-11 RX ORDER — LIDOCAINE HYDROCHLORIDE 10 MG/ML
INJECTION INFILTRATION; PERINEURAL
Status: COMPLETED
Start: 2021-04-11 | End: 2021-04-11

## 2021-04-11 RX ORDER — SODIUM CHLORIDE 0.9 % (FLUSH) 0.9 %
5-40 SYRINGE (ML) INJECTION AS NEEDED
Status: DISCONTINUED | OUTPATIENT
Start: 2021-04-11 | End: 2021-04-12

## 2021-04-11 RX ORDER — LANOLIN ALCOHOL/MO/W.PET/CERES
CREAM (GRAM) TOPICAL
COMMUNITY

## 2021-04-11 RX ORDER — OXYTOCIN/RINGER'S LACTATE 30/500 ML
500 PLASTIC BAG, INJECTION (ML) INTRAVENOUS CONTINUOUS
Status: DISCONTINUED | OUTPATIENT
Start: 2021-04-11 | End: 2021-04-12 | Stop reason: HOSPADM

## 2021-04-11 RX ORDER — OXYTOCIN/RINGER'S LACTATE 30/500 ML
87.3 PLASTIC BAG, INJECTION (ML) INTRAVENOUS AS NEEDED
Status: DISCONTINUED | OUTPATIENT
Start: 2021-04-11 | End: 2021-04-12 | Stop reason: HOSPADM

## 2021-04-11 RX ORDER — HYDROCORTISONE ACETATE PRAMOXINE HCL 2.5; 1 G/100G; G/100G
CREAM TOPICAL AS NEEDED
Status: DISCONTINUED | OUTPATIENT
Start: 2021-04-11 | End: 2021-04-12 | Stop reason: HOSPADM

## 2021-04-11 RX ORDER — NALOXONE HYDROCHLORIDE 0.4 MG/ML
0.4 INJECTION, SOLUTION INTRAMUSCULAR; INTRAVENOUS; SUBCUTANEOUS AS NEEDED
Status: DISCONTINUED | OUTPATIENT
Start: 2021-04-11 | End: 2021-04-12 | Stop reason: HOSPADM

## 2021-04-11 RX ORDER — SIMETHICONE 80 MG
80 TABLET,CHEWABLE ORAL
Status: DISCONTINUED | OUTPATIENT
Start: 2021-04-11 | End: 2021-04-12 | Stop reason: HOSPADM

## 2021-04-11 RX ORDER — DOCUSATE SODIUM 100 MG/1
100 CAPSULE, LIQUID FILLED ORAL
Status: DISCONTINUED | OUTPATIENT
Start: 2021-04-11 | End: 2021-04-12 | Stop reason: HOSPADM

## 2021-04-11 RX ORDER — MAG HYDROX/ALUMINUM HYD/SIMETH 200-200-20
30 SUSPENSION, ORAL (FINAL DOSE FORM) ORAL
Status: DISCONTINUED | OUTPATIENT
Start: 2021-04-11 | End: 2021-04-12 | Stop reason: HOSPADM

## 2021-04-11 RX ORDER — IBUPROFEN 800 MG/1
800 TABLET ORAL EVERY 8 HOURS
Status: DISCONTINUED | OUTPATIENT
Start: 2021-04-11 | End: 2021-04-11

## 2021-04-11 RX ORDER — LIDOCAINE HYDROCHLORIDE 10 MG/ML
10 INJECTION INFILTRATION; PERINEURAL ONCE
Status: COMPLETED | OUTPATIENT
Start: 2021-04-11 | End: 2021-04-11

## 2021-04-11 RX ORDER — ONDANSETRON 2 MG/ML
4 INJECTION INTRAMUSCULAR; INTRAVENOUS
Status: DISCONTINUED | OUTPATIENT
Start: 2021-04-11 | End: 2021-04-12 | Stop reason: HOSPADM

## 2021-04-11 RX ORDER — SODIUM CHLORIDE 0.9 % (FLUSH) 0.9 %
5-40 SYRINGE (ML) INJECTION EVERY 8 HOURS
Status: DISCONTINUED | OUTPATIENT
Start: 2021-04-11 | End: 2021-04-12

## 2021-04-11 RX ORDER — OXYTOCIN/RINGER'S LACTATE 30/500 ML
10 PLASTIC BAG, INJECTION (ML) INTRAVENOUS AS NEEDED
Status: DISCONTINUED | OUTPATIENT
Start: 2021-04-11 | End: 2021-04-12 | Stop reason: HOSPADM

## 2021-04-11 RX ORDER — HYDROCODONE BITARTRATE AND ACETAMINOPHEN 5; 325 MG/1; MG/1
1 TABLET ORAL
Status: DISCONTINUED | OUTPATIENT
Start: 2021-04-11 | End: 2021-04-12 | Stop reason: HOSPADM

## 2021-04-11 RX ADMIN — IBUPROFEN 800 MG: 800 TABLET, FILM COATED ORAL at 12:43

## 2021-04-11 RX ADMIN — Medication 10 ML: at 09:30

## 2021-04-11 RX ADMIN — OXYTOCIN 999 ML/HR: 10 INJECTION, SOLUTION INTRAMUSCULAR; INTRAVENOUS at 11:52

## 2021-04-11 RX ADMIN — ACETAMINOPHEN 650 MG: 325 TABLET ORAL at 18:13

## 2021-04-11 RX ADMIN — LIDOCAINE HYDROCHLORIDE 10 ML: 10 INJECTION INFILTRATION; PERINEURAL at 11:55

## 2021-04-11 RX ADMIN — IBUPROFEN 800 MG: 800 TABLET, FILM COATED ORAL at 19:53

## 2021-04-11 RX ADMIN — LIDOCAINE HYDROCHLORIDE 10 ML: 10 INJECTION, SOLUTION INFILTRATION; PERINEURAL at 11:55

## 2021-04-11 NOTE — LACTATION NOTE
This note was copied from a baby's chart. Experienced breastfeeding mother. Baby put to breast after delivery. She did latch on and suckled a few times but sounded congested in nasal area and was not interested in nursing. Mother kept baby skin to skin. Feeding cues and hand expression reviewed. Discussed with mother her plan for feeding. Reviewed the benefits of exclusive breast milk feeding during the hospital stay. Informed her of the risks of using formula to supplement in the first few days of life as well as the benefits of successful breast milk feeding; referred her to the Breastfeeding booklet about this information. She acknowledges understanding of information reviewed and states that it is her plan to breastfeed her infant. Will support her choice and offer additional information as needed. Encouraged mom to attempt feeding with baby led feeding cues. Just as sucking on fingers, rooting, mouthing. Looking for 8-12 feedings in 24 hours. Don't limit baby at breast, allow baby to come of breast on it's own. Baby may want to feed  often and may increase number of feedings on second day of life. Skin to skin encouraged. If baby doesn't nurse,  Mom should  hand express  10-20 drops of colostrum and drip into baby's mouth, or give to baby by finger feeding, cup feeding, or spoon feeding at least every 2-3 hours. Mother will successfully establish breastfeeding by feeding in response to early feeding cues   or wake every 3h, will obtain deep latch, and will keep log of feedings/output. Taught to BF at hunger cues and or q 2-3 hrs and to offer 10-20 drops of hand expressed colostrum at any non-feeds.       Breast Assessment  Left Breast: Medium  Left Nipple: Everted, Intact, Short  Right Breast: Medium  Right Nipple: Everted, Intact, Short  Breast- Feeding Assessment  Attends Breast-Feeding Classes: No  Breast-Feeding Experience: Yes(3rd baby to breastfeed)  Breast Trauma/Surgery: No  Type/Quality: Attempted  Lactation Consultant Visits  Breast-Feedings: Attempted breast-feeding(Baby put to breast after delivery- she sounded congested and spitty. Mother states she did not push for long. Baby placed skin to skin and fell asleep on mom's chest.)  Mother/Infant Observation  Mother Observation: Alignment, Holds breast, Close hold  Infant Observation: Opens mouth  LATCH Documentation  Latch: (Breastfeeding attempted)     Breastfeeding handouts and LC# given.

## 2021-04-11 NOTE — PROGRESS NOTES
The patient is having regular painful uterine contractions every 1-2 minutes and desires amniotomy to help expedite labor.     Visit Vitals  BP (!) 105/59   Pulse 80   Temp 98 °F (36.7 °C)   Resp 16   Ht 5' 5\" (1.651 m)   Wt 84.4 kg (186 lb)   SpO2 98%   BMI 30.95 kg/m²     FHR: 135 moderate variability, no decelerations, cat 1  Gower: contractions q 1-2 minutes  SVE: 8/90/-2 vtx, amniotomy- clear fluid    Ass/Plan:  at 39 4/7 wks in active labor  Prepare for

## 2021-04-11 NOTE — L&D DELIVERY NOTE
Delivery Summary    Patient: Carie Isbell MRN: [id number]  SSN: [id number]    YOB: 1985  Age: 30 y.o. Sex: female       Information for the patient's :  Marlee Pathak [[id number]]       Labor Events:    Labor: No    Steroids: None   Cervical Ripening Date/Time:       Cervical Ripening Type: None   Antibiotics During Labor: No   Rupture Identifier: Sac 1    Rupture Date/Time: 2021 10:46 AM   Rupture Type: AROM   Amniotic Fluid Volume: Moderate    Amniotic Fluid Description: Clear    Amniotic Fluid Odor:      Induction: None       Induction Date/Time:        Indications for Induction:      Augmentation: AROM   Augmentation Date/Time: 110:46 AM   Indications for Augmentation: Ineffective Contraction Pattern   Labor complications: None       Additional complications:        Delivery Events:  Indications For Episiotomy:     Episiotomy: None   Perineal Laceration(s): None   Repaired:     Periurethral Laceration Location:      Repaired:     Labial Laceration Location:     Repaired:     Sulcal Laceration Location:     Repaired:     Vaginal Laceration Location: left   Repaired: Yes   Cervical Laceration Location:     Repaired:     Repair Suture: Vicryl 3-0   Number of Repair Packets:     Estimated Blood Loss (ml):  ml   Quantitative Blood Loss (ml)                Delivery Date: 2021    Delivery Time: 11:49 AM  Delivery Type: Vaginal, Spontaneous  Sex:  Female    Gestational Age: 39w4d   Delivery Clinician:  Josselin Ward  Living Status: Living   Delivery Location: L&D room 206          APGARS  One minute Five minutes Ten minutes   Skin color: 1   1        Heart rate: 2   2        Grimace: 2   2        Muscle tone: 2   2        Breathin   2        Totals: 9   9            Presentation: Vertex    Position:   Occiput    Resuscitation Method:  Suctioning-bulb; Tactile Stimulation     Meconium Stained: None      Cord Information: 3 Vessels  Complications: None  Cord around:    Delayed cord clamping? Yes  Cord clamped date/time:2021 11:50 AM  Disposition of Cord Blood: Discard    Blood Gases Sent?: No    Placenta:  Date/Time: 2021 11:53 AM  Removal: Expressed      Appearance: Normal     Gully Measurements:  Birth Weight:        Birth Length:        Head Circumference:        Chest Circumference:       Abdominal Girth: Other Providers:   Henrry Modi, Obstetrician;Primary Nurse;Primary Gully Nurse;Charge Nurse           Group B Strep:   Lab Results   Component Value Date/Time    GrBStrep, External Negative 2021     Information for the patient's :  Mague Ek [[id number]]   No results found for: ABORH, PCTABR, PCTDIG, BILI, ABORHEXT, ABORH     No results for input(s): PCO2CB, PO2CB, HCO3I, SO2I, IBD, PTEMPI, SPECTI, PHICB, ISITE, IDEV, IALLEN in the last 72 hours. Delivery Note   of a term viable female infant in JODY presentation with apgars 9,9. Delivery of the infant was followed by a copious amount of amniotic fluid. The infant was placed on the mother's abdomen and clamping of the umbilical cord was delayed for 1 minute. The umbilical cord was then clamped and was cut by the FOB. The placenta was manually expressed and appeared intact. There was a left vaginal laceration the was infiltrated with 1% Lidocaine and repaired with 3-0 Vicryl.  ml.

## 2021-04-11 NOTE — PROGRESS NOTES
2557: Patient transferred from ED to labor and delivery following pt reports of contractions since 0745 this morning. Pt denies any leaking of fluid or vaginal bleeding and pt stating that her plan is no epidural and to be monitored and hooked up to IV tubing and monitors minimally    0900: SVE per this RN, 6/70/-2 noted    0902: SENTHIL Mckeon, RN reviewing and signing consent forms with patient at this time and placing IV at this time    [id number]: Dr. Dany Reyes at pt bedside at this time performing pt assessment and discussing POC with patient. 0920: Dr. Ramona Alvarez pt can come off of EFM for intermittent fetal monitoring and ambulation in room/hallway. 2724: This RN taking pt off of EFM following reactive NST for intermittent monitoring and ambulation per Dr. Ramona Alvarez. Pt drinking water PO and clear liquid tray, no IV fluids connected at this time due to oral hydration    1000: Pt returning to bed from restroom for intermittent monitoring, pt requesting SVE due to complaint of increasing contraction frequency and intensity, this RN verbalizing agreement    1001: This RN placing pt on EFM    1003: Pt blanca, palpation moderate    1003: SVE per this RN, 7/80/-1 posterior, BBOW noted. 1006: This RN removing pt from EFM at this time per intermittent EFM order for pt to continue ambulation in room    1026: SVE per this RN due to pt request and complaint of rectal pressure, 8/90/-1 noted. Pt stating that she wants to remain in bed at this time, this RN placing pt on EFM for fetal monitoring    1028: Pt informing this RN that she would like her membranes broken AROM at this time by MD.     1029: A. Isabel Dandy calling Dr. Dany Reyes and notifying her of pt request. MD stating she will be over to perform AROM per pt request in approximately 10 minutes    [phone]: Pt updated and verbalizing agreement and understanding. Pt continuing to breathe through contractions with spouse support at bedside    1030: A. Isabel Dandy and this RN preparing room for delivery and notifying CATHI Rodriges of likely upcoming delivery    1043: Dr. Syl Kelsey at pt bedside for AROM per pt request    1046: SVE per MD, 8-9/100/-2 noted, AROM per MD moderate amount of clear fluid noted. Talita care performed and pad changed    1101: SVE per this RN due to pt request and pt complaint of rectal pressure and urge to push, unchanged cervix of 8cm dilation noted. Pt requesting foot rests from [facility] for her feet to rest on when in bed, this RN assisting pt with positioning and foot rests. Pt continuing to breathe through contractions with spouse support at pt bedside. This RN continuing to remain at pt bedside for support    1104: Dr. Syl Kelsey at pt bedside reviewing EFM and checking in with pt, no new orders or concerns at this time    1116: Pt verbalizing that she would like RN to delay drawing type and screen pink top tube until after delivery if needed due to pain with contractions right now    1127: Pt continuing to hydrate PO, breathing through contractions in bed while gripping side rail, pt spouse and this RN remaining at pt bedside for support    1135: This RN continuing to educate pt on position change, pt agreeing to try hands and knees. This RN assisting pt and pt declining to continue with position change after more contraction discomfort felt when on L side, this RN assisting pt back to high fowlers    1144: Pt in hands and knees with this RN's assistance    1145: Pt vocalizing with contraction, Dr. Syl Kelsey at pt bedside for SVE and delivery    1147: MD gowning up and at pt perineum for assessment, SVE per MD, 9.5 cm and reducible with pushing. MD to start pushing with pt at this time, this RN and MD remaining at pt bedside    1148:Pt in lithotomy position with legs in stirrups. MD at pt perineum, pt educated how to push and now bearing down with each contraction    1148: Infant head out, MD assessing for nuchals, none noted,  viable female infant. Infant placed on maternal abdomen and dried and stimulated    1143: Placenta delivered at this time, MD performing fundal massage    1210: Dr. Evon Wooten leaving pt bedside following vaginal repair    [phone]: Pt ambulating to restroom at this time with this RN's assistance to void, pt tolerating well    1420: Pt ambulating to restroom at this time with this RN's assistance to void, pt tolerating well    1910: Bedside, Verbal and Written shift change report given to RUBIA Randolph RN (oncoming nurse) by Omid Nick RN (offgoing nurse). Report included the following information SBAR, Kardex, Intake/Output, MAR, Accordion and Recent Results.

## 2021-04-11 NOTE — H&P
History & Physical    Name: Ana Campos MRN: [id number]  SSN: [id number]    YOB: 1985  Age: 30 y.o. Sex: female        Subjective:     Estimated Date of Delivery: 21  OB History    Para Term  AB Living   3 2 2 0 0 1   SAB TAB Ectopic Molar Multiple Live Births   0 0 0   0 1      # Outcome Date GA Lbr Pino/2nd Weight Sex Delivery Anes PTL Lv   3 Current            2 Term 19     Vag-Spont      1 Term 01/15/17 40w2d 08:37 / 00:49 3.665 kg M Eveline Gutierrez Record is  admitted with pregnancy at 39w4d for active labor. The patient presents with regular painful uterine contractions. Denies leakage of vaginal fluid and vaginal bleeding. Her prenatal care has been uncomplicated. Please see prenatal records for details. COVID Screen: negative    Past Medical History:   Diagnosis Date    Asthma     exercise induced    Fibrocystic breast disease 3/2012    Pap smear for cervical cancer screening 2016    negative    PCOS (polycystic ovarian syndrome)     Polycystic disease, ovaries    varicose veins  No past surgical history on file.   Social History     Occupational History    Not on file   Tobacco Use    Smoking status: Never Smoker    Smokeless tobacco: Never Used   Substance and Sexual Activity    Alcohol use: No     Alcohol/week: 0.0 standard drinks    Drug use: No    Sexual activity: Yes     Partners: Male     Birth control/protection: None   GynHx: denies STIs  Family History   Problem Relation Age of Onset    Other Sister         PCOS    Thyroid Disease Sister         hypothyroid, prolactinoma    Breast Cancer Paternal Aunt     Colon Cancer Maternal Grandfather     Cancer Paternal Grandmother         melanoma    Diabetes Paternal Grandfather     Heart Disease Paternal Grandfather     Diabetes Father     No Known Problems Mother        No Known Allergies  Prior to Admission medications    Medication Sig Start Date End Date Taking? Authorizing Provider   buPROPion SR [facility] SR) 150 mg SR tablet Take 1 Tab by mouth two (2) times a day. 17   Justin Mujica MD   PRENATAL VIT W-CA,FE,FA,<1 MG, (PRENATAL VITAMIN PO) Take  by mouth. Provider, Historical   DOCOSAHEXANOIC ACID/EPA (FISH OIL PO) Take  by mouth. Provider, Historical   LACTOBACILLUS ACIDOPHILUS (PROBIOTIC PO) Take  by mouth. Provider, Historical     Iron po daily    Review of Systems: A comprehensive review of systems was negative except for that written in the HPI. Objective:     Vitals:  Vitals:    21 0905   Weight: 84.4 kg (186 lb)   Height: 5' 5\" (1.651 m)        Physical Exam:  Patient without distress.   Heart: Regular rate and rhythm or S1S2 present  Lung: clear to auscultation throughout lung fields, no wheezes, no rales, no rhonchi and normal respiratory effort  Abdomen: soft, nontender, gravid, 8#4  Fundus: soft and non tender  Perineum: blood absent, amniotic fluid absent  Cervical Exam: 6/70/-2 vtx (exam by nurse)  Lower Extremities:  - Edema No  Membranes:  Intact  Fetal Heart Rate: Baseline: 130 per minute  Variability: moderate  Accelerations: yes  Decelerations: none  Uterine contractions: regular, every 7 minutes    Prenatal Labs:   Lab Results   Component Value Date/Time    Rubella, External 8.34 2016    GrBStrep, External Negative 2016    HBsAg, External negative 2016    HIV, External non-reactive 2016    RPR, External non-reactive 2016    Gonorrhea, External negative 2016    Chlamydia, External negative 2016    ABO,Rh A positive 2016     Labs reviewed and are unchanged from above    Assessment/Plan:      29 yo  at 39 4/7 wks in active labor, GBS negative, reactive cat 1 fetal tracing    Patient plans for unmedicated childbirth   Labor management

## 2021-04-12 VITALS
DIASTOLIC BLOOD PRESSURE: 59 MMHG | BODY MASS INDEX: 30.99 KG/M2 | OXYGEN SATURATION: 99 % | HEART RATE: 67 BPM | WEIGHT: 186 LBS | SYSTOLIC BLOOD PRESSURE: 109 MMHG | RESPIRATION RATE: 16 BRPM | TEMPERATURE: 98 F | HEIGHT: 65 IN

## 2021-04-12 PROCEDURE — 74011250637 HC RX REV CODE- 250/637: Performed by: OBSTETRICS & GYNECOLOGY

## 2021-04-12 RX ADMIN — ACETAMINOPHEN 650 MG: 325 TABLET ORAL at 00:36

## 2021-04-12 RX ADMIN — IBUPROFEN 800 MG: 800 TABLET, FILM COATED ORAL at 04:07

## 2021-04-12 RX ADMIN — IBUPROFEN 800 MG: 800 TABLET, FILM COATED ORAL at 12:47

## 2021-04-12 NOTE — PROGRESS NOTES
3:34 PM  Patient discharged to home. Discharge instructions and education completed and patient reported she had no more questions. Bands verified on patient and infant, see footprint sheet. Infant placed in car seat by parent.      Prescriptions:   None

## 2021-04-12 NOTE — DISCHARGE SUMMARY
Obstetrical Discharge Summary     Name: Eula Cherry MRN: [id number]  SSN: [id number]    YOB: 1985  Age: 30 y.o. Sex: female      Admit Date: 2021    Discharge Date: 2021     Admitting Physician: Juan Ramon Rodrigues MD     Attending Physician:  Skylar Patrick MD     Admission Diagnoses: Term pregnancy [Z34.90]    Discharge Diagnoses:   Information for the patient's :  Frankie Freitas [[id number]]   Delivery of a 3.65 kg female infant via Vaginal, Spontaneous on 2021 at 11:49 AM  by Juan Ramon Rodrigues. Apgars were 9  and 9 . Additional Diagnoses:   Hospital Problems  Date Reviewed: 2017          Codes Class Noted POA    Term pregnancy ICD-10-CM: Z34.90  ICD-9-CM: V22.1  2021 Unknown             Lab Results   Component Value Date/Time    Rubella, External Immune 2020    GrBStrep, External Negative 2021       Hospital Course: Normal hospital course following the delivery. Disposition at Discharge: Home or self care    Discharged Condition: Stable    Patient Instructions:   Current Discharge Medication List      CONTINUE these medications which have NOT CHANGED    Details   cholecalciferol (VITAMIN D3) (2,000 UNITS /50 MCG) cap capsule Take  by mouth daily. magnesium 250 mg tab Take  by mouth. ferrous sulfate (Iron) 325 mg (65 mg iron) tablet Take  by mouth Daily (before breakfast). PRENATAL VIT W-CA,FE,FA,<1 MG, (PRENATAL VITAMIN PO) Take  by mouth. DOCOSAHEXANOIC ACID/EPA (FISH OIL PO) Take  by mouth. LACTOBACILLUS ACIDOPHILUS (PROBIOTIC PO) Take  by mouth. STOP taking these medications       buPROPion SR (WELLBUTRIN SR) 150 mg SR tablet Comments:   Reason for Stopping:               Reference my discharge instructions.     Follow-up Appointments   Procedures    FOLLOW UP VISIT Appointment in: Two Weeks Consider 2 week mood check given hx anxiety     Consider 2 week mood check given hx anxiety Standing Status:   Standing     Number of Occurrences:   1     Order Specific Question:   Appointment in     Answer:    Two Weeks        Signed By:  Jez Glass MD     April 12, 2021

## 2021-04-12 NOTE — DISCHARGE INSTRUCTIONS
Patient Education        Learning About Starting to Breastfeed  Planning ahead     Before your baby is born, plan ahead. Learn all you can about breastfeeding. This helps make breastfeeding easier. · Early in your pregnancy, talk to your doctor or midwife about breastfeeding. · Learn the basics before your baby is born. The staff at hospitals and birthing centers can help you find a lactation specialist. This person is often a nurse who has been trained to teach and advise women about breastfeeding. Or you can take a breastfeeding class. · Plan ahead for times when you will need help after your baby is born. Many women get help from friends and family. Some join a support group to talk to other moms who breastfeed. · Buy the equipment you'll need. Examples are breast pads, nipple cream, extra pillows, and nursing bras. Find out about breast pumps too. Getting help from your hospital or birthing center  It's important to have support from the doctors, nurses, and hospital staff who care for you and your baby. Before it's time for you to give birth, ask about the breastfeeding policies at your hospital or birthing center. Look for a hospital or birthing center that has policies for:  · \"Rooming in. \" This policy encourages you to have your baby in the room with you. It can allow you to breastfeed more often. · Supplemental feedings. Tell the staff that your baby is to get only your breast milk from birth. If staff feed your baby water, sugar solution, or formula right after birth without a medical reason, it may make it harder for you to breastfeed. · Pacifiers or artificial nipples. Staff should not give your  these items. They may interfere with breastfeeding. · Follow-up. Find out if your hospital can help you with breastfeeding issues after you go home. See if you can get information on support groups or other contacts.  They might help if you need help setting up and staying with your breastfeeding routine. Your first feeding  It's best to start breastfeeding within 1 hour of birth. For each feeding, you go through these basic steps:  · Get ready for the feeding. Be calm and relaxed, and try not to be distracted. Get some water or juice for yourself. Use two or three pillows to help support your baby while he or she is nursing. · Find a breastfeeding position that is comfortable for you and your baby. Examples are the cradle and the football positions. Make sure the baby's head and chest are lined up straight and facing your breast. It's best to switch which breast you start with each time. · Get the baby latched on well. Your baby's mouth needs to be wide open, like a yawn, so you may need to gently touch the middle of your baby's lower lip. When your baby's mouth is open wide, quickly bring the baby onto your nipple and areola. The areola is the dark [place] around your nipple. · Provide a complete feeding. Let your baby decide how long to nurse. Be sure to burp your baby after each breast.  In the first days after birth, your breasts make a thick, yellow liquid called colostrum. This liquid gives your baby nutrients and antibodies against infection. It is all that babies need at first. Your breasts will fill with milk a few days after the birth. Talk to your doctor, midwife, or lactation specialist right away if you are having problems and aren't sure what to do. How often to breastfeed  Plan to breastfeed your baby on demand rather than setting a strict schedule. For the first 2 weeks, be prepared to breastfeed at least 8 times in a 24-hour period. In the first few days, you may need to wake a sleeping baby to feed. If you breastfeed more often, it will help your breasts to produce more milk. After you go home  After you're home, don't be afraid to call your doctor, midwife, or lactation specialist with questions. That's true even if you don't know what's bothering you.  They are used to parents of newborns calling. They can help you figure out if there is a problem, and if so, how to fix it. Plan for times when you will be apart from your baby. Use a breast pump to collect breast milk ahead of time. You can store milk in the refrigerator or freezer. Then it's ready when someone else will be taking care of your baby. Experts recommend waiting about a month until breastfeeding is going well before offering a bottle. Breastfeeding is a learned skill that gets easier over time. You are more likely to succeed if you plan ahead, learn the basic techniques, and know where to get help and support. Where can you learn more? Go to http://www.gray.com/  Enter Q917 in the search box to learn more about \"Learning About Starting to Breastfeed. \"  Current as of: October 8, 2020               Content Version: 12.8  © 2415-2583 "[employer]. Care instructions adapted under license by [employer] (which disclaims liability or warranty for this information). If you have questions about a medical condition or this instruction, always ask your healthcare professional. [place] [address] any warranty or liability for your use of this information. Patient Education   Patient Education   Vaginal Childbirth with Episiotomy, Laceration or Tear: What To Expect At [facility] body will slowly heal in the next few weeks. It is easy to get too tired and overwhelmed during the first weeks after your baby is born. Changes in your hormones can shift your mood without warning. You may find it hard to meet the extra demands on your energy and time. Take it easy on yourself. Follow-up care is a key part of your treatment and safety. Be sure to make and go to all appointments, and call your doctor if you are having problems. It's also a good idea to know your test results and keep a list of the medicines you take.     How can you care for yourself at home?    Vaginal Bleeding and Cramps  · After delivery, you will have a bloody discharge from the vagina. This will turn pink within a week and then white or yellow after about 10 days. It may last for 2 to 4 weeks or longer, until the uterus has healed. Use pads instead of tampons until you stop bleeding. · Do not worry if you pass some blood clots, as long as they are smaller than a golf ball. If you have a tear or stitches in your vaginal area, change the pad at least every 4 hours to prevent soreness and infection. · You may have cramps for the first few days after childbirth. These are normal and occur as the uterus shrinks to normal size. Take an over-the-counter pain medicine, such as acetaminophen (Tylenol), ibuprofen (Advil, Motrin), or naproxen (Aleve), for cramps. Read and follow all instructions on the label. Do not take aspirin, because it can cause more bleeding. Do not take acetaminophen (Tylenol) and other acetaminophen containing medications (i.e. Percocet) at the same time. Episiotomy, Lacerations or Tears  · If you have stitches, they will dissolve on their own and do not need to be removed. · Put ice or a cold pack on your painful area for 10 to 20 minutes at a time, several times a day, for the first few days. Put a thin cloth between the ice and your skin. · Sit in a few inches of warm water (sitz bath) 3 times a day and after bowel movements. The warm water helps with pain and itching. If you do not have a tub, a warm shower might help. Breast fullness  · Your breasts may overfill (engorge) in the first few days after delivery. To help milk flow and to relieve pain, warm your breasts in the shower or by using warm, moist towels before nursing. · If you are not nursing, do not put warmth on your breasts or touch your breasts. Wear a tight bra or sports bra and use ice until the fullness goes away. This usually takes 2 to 3 days.   · Put ice or a cold pack on your breast after nursing to reduce swelling and pain. Put a thin cloth between the ice and your skin. Activity  · Eat a balanced diet. Do not try to lose weight by cutting calories. Keep taking your prenatal vitamins, or take a multivitamin. · Get as much rest as you can. Try to take naps when your baby sleeps during the day. · Get some exercise every day. But do not do any heavy exercise until your doctor says it is okay. · Wait until you are healed (about 4 to 6 weeks) before you have sexual intercourse. Your doctor will tell you when it is okay to have sex. · Talk to your doctor about birth control. You can get pregnant even before your period returns. Also, you can get pregnant while you are breast-feeding. Mental Health  · Many women get the \"baby blues\" during the first few days after childbirth. You may lose sleep, feel irritable, and cry easily. You may feel happy one minute and sad the next. Hormone changes are one cause of these emotional changes. Also, the demands of a new baby, along with visits from relatives or other family needs, add to a mother's stress. The \"baby blues\" often peak around the fourth day. Then they ease up in less than 2 weeks. · If your moodiness or anxiety lasts for more than 2 weeks, or if you feel like life is not worth living, you may have postpartum depression. This is different for each mother. Some mothers with serious depression may worry intensely about their infant's well-being. Others may feel distant from their child. Some mothers might even feel that they might harm their baby. A mother may have signs of paranoia, wondering if someone is watching her. · With all the changes in your life, you may not know if you are depressed. Pregnancy sometimes causes changes in how you feel that are similar to the symptoms of depression. · Symptoms of depression include:  · Feeling sad or hopeless and losing interest in daily activities.  These are the most common symptoms of depression. · Sleeping too much or not enough. · Feeling tired. You may feel as if you have no energy. · Eating too much or too little. · POSTPARTUM SUPPORT INTERNATIONAL (PSI) offers a Warm line; Chat with the Expert phone sessions; Information and Articles about Pregnancy and Postpartum Mood Disorders; Comprehensive List of Free Support Groups; Knowledgeable local coordinators who will offer support, information, and resources; Guide to Resources on [employer] (Internap); Calendar of events in the  mood disorders community; Latest News and Research; and [facility] [address] for [employer]. Remember - You are not alone; You are not to blame; With help, you will be well. 1-663-275-PPD(8081). WWW. POSTPARTUM. NET    · Writing or talking about death, such as writing suicide notes or talking about guns, knives, or pills. Keep the numbers for these national suicide hotlines: 1-822-012-TALK (7-401.386.8438) and 3-406-WEFOHAR (0-874.267.8060). If you or someone you know talks about suicide or feeling hopeless, get help right away. Constipation and Hemorrhoids  Drink plenty of fluids, enough so that your urine is light yellow or clear like water. If you have kidney, heart, or liver disease and have to limit fluids, talk with your doctor before you increase the amount of fluids you drink. · Eat plenty of fiber each day. Have a bran muffin or bran cereal for breakfast, and try eating a piece of fruit for a mid-afternoon snack. · For painful, itchy hemorrhoids, put ice or a cold pack on the area several times a day for 10 minutes at a time. Follow this by putting a warm compress on the area for another 10 to 20 minutes or by sitting in a shallow, warm bath. When should you call for help? Call 911 anytime you think you may need emergency care. For example, call if:  · You are thinking of hurting yourself, your baby, or anyone else. · You passed out (lost consciousness).   · You have symptoms of a blood clot in your lung (called a pulmonary embolism). These may include:  · Sudden chest pain. · Trouble breathing. · Coughing up blood. Call your doctor now or seek immediate medical care if:  · You have severe vaginal bleeding. · You are soaking through a pad each hour for 2 or more hours. · Your vaginal bleeding seems to be getting heavier or is still bright red 4 days after delivery. · You are dizzy or lightheaded, or you feel like you may faint. · You are vomiting or cannot keep fluids down. · You have a fever. · You have new or more belly pain. · You pass tissue (not just blood). · Your vaginal discharge smells bad. · Your belly feels tender or full and hard. · Your breasts are continuously painful or red. · You feel sad, anxious, or hopeless for more than a few days. · You have sudden, severe pain in your belly. · You have symptoms of a blood clot in your leg (called a deep vein thrombosis), such as:  · Pain in your calf, back of the knee, thigh, or groin. · Redness and swelling in your leg or groin. · You have symptoms of preeclampsia, such as:  · Sudden swelling of your face, hands, or feet. · New vision problems (such as dimness or blurring). · A severe headache. · Your blood pressure is higher than it should be or rises suddenly. · You have new nausea or vomiting. Watch closely for changes in your health, and be sure to contact your doctor if you have any problems. Depression After Childbirth: Care Instructions  Overview     Many women get the \"baby blues\" during the first few days after childbirth. You may lose sleep, feel irritable, and cry easily. You may feel happy one minute and sad the next. Hormone changes are one cause of these emotional changes. Also, the demands of a new baby, along with visits from relatives or other family needs, can add to the stress. The \"baby blues\" often peak around the fourth day. Then they ease up in less than 2 weeks.   If your moodiness or anxiety lasts for more than 2 weeks, or if you feel like life is not worth living, you may have postpartum depression. This is different for each person. Some mothers with serious depression may worry intensely about their infant's well-being. Others may feel distant from their child. Some mothers may even feel that they might harm their baby. Some may have signs of paranoia, wondering if someone is watching them. Depression is not a sign of weakness. It's a medical condition that requires treatment. Medicine and counseling often work well to reduce depression. Talk to your doctor about taking antidepressant medicine while breastfeeding. Follow-up care is a key part of your treatment and safety. Be sure to make and go to all appointments, and call your doctor if you are having problems. It's also a good idea to know your test results and keep a list of the medicines you take. How do you know if you are depressed? With all the changes in your life, you may not know if you are depressed. Pregnancy sometimes causes changes in how you feel that are similar to the symptoms of depression. Symptoms of depression include:  · Feeling sad or hopeless and losing interest in daily activities. These are the most common symptoms of depression. · Sleeping too much or not enough. · Feeling tired. You may feel as if you have no energy. · Eating too much or too little. · Writing or talking about death, such as writing suicide notes or talking about guns, knives, or pills. Keep the numbers for these national suicide hotlines: 0-959-611-TALK (4-273.435.7136) and 9-990-CMBIWXR (3-788.298.9009). If you or someone you know talks about suicide or feeling hopeless, get help right away. How can you care for yourself at home? · Be safe with medicines. Take your medicines exactly as prescribed. Call your doctor if you think you are having a problem with your medicine. · Eat a healthy diet so that you can keep up your energy.   · Get regular daily exercise, such as walks, to help improve your mood. · Get as much sunlight as possible. Keep your shades and curtains open. Get outside as much as you can. · Avoid using alcohol or other substances to feel better. · Get as much rest and sleep as possible. Avoid doing too much. Being too tired can increase depression. · Play stimulating music throughout your day and soothing music at night. · Schedule outings and visits with friends and family. Ask them to call you regularly, so that you don't feel alone. · Ask for help with preparing food and other daily tasks. Family and friends are often happy to help with a . · Be honest with yourself and those who care about you. Tell them about your struggle. · Join a support group of new mothers. No one can better understand the challenges of caring for a  than other new mothers. · If you feel like life is not worth living or you're feeling hopeless, get help right away. Keep the numbers for these national suicide hotlines: 5-432-148-TALK (9-787.463.5718) and 0-651-BFJQTPX (9-448.284.3495). When should you call for help? Call 911 anytime you think you may need emergency care. For example, call if:    · You feel you cannot stop from hurting yourself, your baby, or someone else. Call your doctor now or seek immediate medical care if:    · You are having trouble caring for yourself or your baby.     · You hear voices. Watch closely for changes in your health, and be sure to contact your doctor if:    · You have problems with your depression medicine.     · You do not get better as expected. Where can you learn more? Go to http://www.gray.com/  Enter [id number] in the search box to learn more about \"Depression After Childbirth: Care Instructions. \"  Current as of: 2020               Content Version: 12.8  © 6781-2238 [employer].    Care instructions adapted under license by [employer] (which disclaims liability or warranty for this information). If you have questions about a medical condition or this instruction, always ask your healthcare professional. [address] any warranty or liability for your use of this information. After Your Delivery (the Postpartum Period): Care Instructions  Your Care Instructions     Congratulations on the birth of your baby. Like pregnancy, the  period can be a time of excitement, hali, and exhaustion. You may look at your wondrous little baby and feel happy. You may also be overwhelmed by your new sleep hours and new responsibilities. At first, babies often sleep during the days and are awake at night. They do not have a pattern or routine. They may make sudden gasps, jerk themselves awake, or look like they have crossed eyes. These are all normal, and they may even make you smile. In these first weeks after delivery, try to take good care of yourself. It may take 4 to 6 weeks to feel like yourself again, and possibly longer if you had a  birth. You will likely feel very tired for several weeks. Your days will be full of ups and downs, but lots of hali as well. Follow-up care is a key part of your treatment and safety. Be sure to make and go to all appointments, and call your doctor if you are having problems. It's also a good idea to know your test results and keep a list of the medicines you take. How can you care for yourself at home? Take care of your body after delivery  · Use pads instead of tampons for the bloody flow that may last as long as 2 weeks. · Ease cramps with ibuprofen (Advil, Motrin). · Ease soreness of hemorrhoids and the area between your vagina and rectum with ice compresses or witch hazel pads. · Ease constipation by drinking lots of fluid and eating high-fiber foods. Ask your doctor about over-the-counter stool softeners.   · Cleanse yourself with a gentle squeeze of warm water from a bottle instead of wiping with toilet paper. · Take a sitz bath in warm water several times a day. · Wear a good nursing bra. Ease sore and swollen breasts with warm, wet washcloths. · If you are not breastfeeding, use ice rather than heat for breast soreness. · Your period may not start for several months if you are breastfeeding. You may bleed more, and longer at first, than you did before you got pregnant. · Wait until you are healed (about 4 to 6 weeks) before you have sexual intercourse. Your doctor will tell you when it is okay to have sex. · Try not to travel with your baby for 5 or 6 weeks. If you take a long car trip, make frequent stops to walk around and stretch. Avoid exhaustion  · Rest every day. Try to nap when your baby naps. · Ask another adult to be with you for a few days after delivery. · Plan for  if you have other children. · Stay flexible so you can eat at odd hours and sleep when you need to. Both you and your baby are making new schedules. · Plan small trips to get out of the house. Change can make you feel less tired. · Ask for help with housework, cooking, and shopping. Remind yourself that your job is to care for your baby. Know about help for postpartum depression  · \"Baby blues\" are common for the first 1 to 2 weeks after birth. You may cry or feel sad or irritable for no reason. · Rest whenever you can. Being tired makes it harder to handle your emotions. · Go for walks with your baby. · Talk to your partner, friends, and family about your feelings. · If your symptoms last for more than a few weeks, or if you feel very depressed, ask your doctor for help. · Postpartum depression can be treated. Support groups and counseling can help. Sometimes medicine can also help. Stay healthy  · Eat healthy foods so you have more energy and lose extra baby pounds. · If you breastfeed, avoid drugs. If you quit smoking during pregnancy, try to stay smoke-free.  If you choose to have a drink now and then, have only one drink, and limit the number of occasions that you have a drink. Wait to breastfeed at least 2 hours after you have a drink to reduce the amount of alcohol the baby may get in the milk. · Start daily exercise after 4 to 6 weeks, but rest when you feel tired. · Learn exercises to tone your belly. Do Kegel exercises to regain strength in your pelvic muscles. You can do these exercises while you stand or sit. ? Squeeze the same muscles you would use to stop your urine. Your belly and thighs should not move. ? Hold the squeeze for 3 seconds, and then relax for 3 seconds. ? Start with 3 seconds. Then add 1 second each week until you are able to squeeze for 10 seconds. ? Repeat the exercise 10 to 15 times for each session. Do three or more sessions each day. · Find a class for new mothers and new babies that has an exercise time. · If you had a  birth, give yourself a bit more time before you exercise, and be careful. When should you call for help? Call  911 anytime you think you may need emergency care. For example, call if:    · You have thoughts of harming yourself, your baby, or another person.     · You passed out (lost consciousness).     · You have chest pain, are short of breath, or cough up blood.     · You have a seizure. Call your doctor now or seek immediate medical care if:    · You have severe vaginal bleeding. This means you are passing blood clots and soaking through a pad each hour for 2 or more hours.     · You are dizzy or lightheaded, or you feel like you may faint.     · You have a fever.     · You have new or more belly pain.     · You have signs of a blood clot in your leg (called a deep vein thrombosis), such as:  ? Pain in the calf, back of the knee, thigh, or groin. ? Redness and swelling in your leg or groin.     · You have signs of preeclampsia, such as:  ? Sudden swelling of your face, hands, or feet.   ? New vision problems (such as dimness, blurring, or seeing spots). ? A severe headache. Watch closely for changes in your health, and be sure to contact your doctor if:    · Your vaginal bleeding seems to be getting heavier.     · You have new or worse vaginal discharge.     · You feel sad, anxious, or hopeless for more than a few days.     · You do not get better as expected. Where can you learn more? Go to http://www.montana.com/  Enter A461 in the search box to learn more about \"After Your Delivery (the Postpartum Period): Care Instructions. \"  Current as of: October 8, 2020               Content Version: 12.8  © 1425-4252 [employer]. Care instructions adapted under license by [employer] (which disclaims liability or warranty for this information). If you have questions about a medical condition or this instruction, always ask your healthcare professional. [address] any warranty or liability for your use of this information.

## 2021-04-12 NOTE — ROUTINE PROCESS
Bedside and Verbal shift change report given to Jose Corea RN (oncoming nurse) by Saba Santos RN (offgoing nurse). Report included the following information SBAR, Kardex, Intake/Output, MAR and Accordion.

## 2021-04-12 NOTE — PROGRESS NOTES
Post-Partum Day Number 1 Progress Note    Kasey Eason     Assessment: Doing well, post partum day 1 s/p  p term labor    Plan:   1. Discharge home today  2. Follow up in office in 6 weeks with Omari Bermudez MD  3. Post partum activity advised, diet as tolerated  4. Discharge Medications: ibuprofen, tylenol, colace and medications prior to admission    Information for the patient's :  Mitchell Keyes [[id number]]   Vaginal, Spontaneous    Patient doing well without significant complaint. Voiding without difficulty, normal lochia. Vitals:  Visit Vitals  BP (!) 102/55 (BP 1 Location: Left upper arm, BP Patient Position: At rest)   Pulse 67   Temp 98.5 °F (36.9 °C)   Resp 16   Ht 5' 5\" (1.651 m)   Wt 84.4 kg (186 lb)   SpO2 99%   Breastfeeding Unknown   BMI 30.95 kg/m²     Temp (24hrs), Av.3 °F (36.8 °C), Min:97.6 °F (36.4 °C), Max:98.6 °F (37 °C)      Exam:         Patient without distress. Abdomen soft, fundus firm, nontender                 Lower extremities are negative for swelling, cords or tenderness.     Labs:     Lab Results   Component Value Date/Time    WBC 8.9 2021 09:05 AM    WBC 8.0 2017 02:34 PM    WBC 11.8 (H) 01/15/2017 01:57 AM    WBC 11.9 (H) 10/17/2016 04:24 PM    WBC 11.0 (H) 2016 04:41 PM    WBC 8.2 2016 05:13 PM    HGB 12.1 2021 09:05 AM    HGB 13.4 2017 02:34 PM    HGB 12.2 01/15/2017 01:57 AM    HGB 11.8 10/17/2016 04:24 PM    HGB 12.8 2016 04:41 PM    HGB 13.4 2016 05:13 PM    HCT 35.8 2021 09:05 AM    HCT 39.7 2017 02:34 PM    HCT 37.3 01/15/2017 01:57 AM    HCT 34.4 10/17/2016 04:24 PM    HCT 38.9 2016 04:41 PM    HCT 39.6 2016 05:13 PM    PLATELET 085  09:05 AM    PLATELET 360  02:34 PM    PLATELET 539 10//2187 01:57 AM    PLATELET 754  04:24 PM    PLATELET 264  04:41 PM    PLATELET 446  05:13 PM    Hgb, External 12.8 06/01/2016    Hct, External 38.9 06/01/2016    Platelet cnt., External 263 06/01/2016       Recent Results (from the past 24 hour(s))   CBC W/O DIFF    Collection Time: 04/11/21  9:05 AM   Result Value Ref Range    WBC 8.9 3.6 - 11.0 K/uL    RBC 3.95 3.80 - 5.20 M/uL    HGB 12.1 11.5 - 16.0 g/dL    HCT 35.8 35.0 - 47.0 %    MCV 90.6 80.0 - 99.0 FL    MCH 30.6 26.0 - 34.0 PG    MCHC 33.8 30.0 - 36.5 g/dL    RDW 13.2 11.5 - 14.5 %    PLATELET 558 424 - 905 K/uL    MPV 9.0 8.9 - 12.9 FL    NRBC 0.0 0  WBC    ABSOLUTE NRBC 0.00 0.00 - 0.01 K/uL   TYPE & SCREEN    Collection Time: 04/11/21  9:05 AM   Result Value Ref Range    Crossmatch Expiration 04/14/2021,2359     ABO/Rh(D) A POSITIVE     Antibody screen NEG

## 2021-04-12 NOTE — LACTATION NOTE
This note was copied from a baby's chart. Breast Assessment  Left Breast: Medium  Left Nipple: Everted, Intact  Right Breast: Medium  Right Nipple: Everted, Intact  Breast- Feeding Assessment  Attends Breast-Feeding Classes: No  Breast-Feeding Experience: Yes(3rd baby to breastfeed)  Breast Trauma/Surgery: No  Type/Quality: Good  Lactation Consultant Visits  Breast-Feedings: Good   Mother/Infant Observation  Mother Observation: Alignment, Lets baby end feeding, Nipple round on release, Breast comfortable, Recognizes feeding cues  Infant Observation: Lips flanged, lower, Latches nipple and aereolae, Audible swallows, Lips flanged, upper, Feeding cues, Opens mouth, Relaxed after feeding, Rhythmic suck, Other (comment)(baby having hiccups)  LATCH Documentation  Latch: Grasps breast, tongue down, lips flanged, rhythmic sucking  Audible Swallowing: Spontaneous and intermittent (24 hours old)  Type of Nipple: Everted (after stimulation)  Comfort (Breast/Nipple): Soft/non-tender  Hold (Positioning): No assist from staff, mother able to position/hold infant  LATCH Score: 10  Chart shows numerous feedings, void, stool WNL. Discussed importance of monitoring outputs and feedings on first week of life. Discussed ways to tell if baby is  getting enough breast milk, ie  voids and stools, change in color of stool, and return to birth wt within 2 weeks. Follow up with pediatrician visit for weight check in 1-2 days (per AAP guidelines.)  Encouraged to call Warm Line  [phone]  for any questions/problems that arise. Mother also given breastfeeding support group dates and times for any future needsAnticipatory guidance given. Questions answered. Discussed signs of baby's allergy, excema. Discussed engorgement management, when breast are soft and flat you are making more milk than when hard and engorged. If you should have to take a medication and MD says can't breast feed contact lactation office.   Breast feed if you or the baby gets sick to pass along natural immunologic protection. Confident breastfeeding Mom.

## 2022-03-02 LAB
ANTIBODY SCREEN, EXTERNAL: NEGATIVE
CHLAMYDIA, EXTERNAL: NEGATIVE
HBSAG, EXTERNAL: NEGATIVE
HEPATITIS C AB,   EXT: NEGATIVE
HIV, EXTERNAL: NORMAL
N. GONORRHEA, EXTERNAL: NEGATIVE
RUBELLA, EXTERNAL: NORMAL
T. PALLIDUM, EXTERNAL: NORMAL
TYPE, ABO & RH, EXTERNAL: NORMAL

## 2022-03-19 PROBLEM — Z34.90 PREGNANCY: Status: ACTIVE | Noted: 2017-01-15

## 2022-03-19 PROBLEM — Z34.90 TERM PREGNANCY: Status: ACTIVE | Noted: 2021-04-11

## 2022-09-23 LAB — GRBS, EXTERNAL: NEGATIVE

## 2022-10-07 ENCOUNTER — HOSPITAL ENCOUNTER (INPATIENT)
Age: 37
LOS: 1 days | Discharge: HOME | End: 2022-10-08
Attending: OBSTETRICS & GYNECOLOGY | Admitting: OBSTETRICS & GYNECOLOGY
Payer: COMMERCIAL

## 2022-10-07 LAB
BASOPHILS # BLD: 0 K/UL (ref 0–0.1)
BASOPHILS NFR BLD: 0 % (ref 0–1)
DIFFERENTIAL METHOD BLD: ABNORMAL
EOSINOPHIL # BLD: 0.1 K/UL (ref 0–0.4)
EOSINOPHIL NFR BLD: 1 % (ref 0–7)
ERYTHROCYTE [DISTWIDTH] IN BLOOD BY AUTOMATED COUNT: 13.1 % (ref 11.5–14.5)
HCT VFR BLD AUTO: 35.3 % (ref 35–47)
HGB BLD-MCNC: 12 G/DL (ref 11.5–16)
IMM GRANULOCYTES # BLD AUTO: 0.1 K/UL (ref 0–0.04)
IMM GRANULOCYTES NFR BLD AUTO: 1 % (ref 0–0.5)
LYMPHOCYTES # BLD: 2.1 K/UL (ref 0.8–3.5)
LYMPHOCYTES NFR BLD: 18 % (ref 12–49)
MCH RBC QN AUTO: 29.9 PG (ref 26–34)
MCHC RBC AUTO-ENTMCNC: 34 G/DL (ref 30–36.5)
MCV RBC AUTO: 87.8 FL (ref 80–99)
MONOCYTES # BLD: 0.7 K/UL (ref 0–1)
MONOCYTES NFR BLD: 6 % (ref 5–13)
NEUTS SEG # BLD: 8.4 K/UL (ref 1.8–8)
NEUTS SEG NFR BLD: 74 % (ref 32–75)
NRBC # BLD: 0 K/UL (ref 0–0.01)
NRBC BLD-RTO: 0 PER 100 WBC
PLATELET # BLD AUTO: 266 K/UL (ref 150–400)
PMV BLD AUTO: 9.6 FL (ref 8.9–12.9)
RBC # BLD AUTO: 4.02 M/UL (ref 3.8–5.2)
WBC # BLD AUTO: 11.5 K/UL (ref 3.6–11)

## 2022-10-07 PROCEDURE — 75410000002 HC LABOR FEE PER 1 HR: Performed by: NURSE PRACTITIONER

## 2022-10-07 PROCEDURE — 65270000029 HC RM PRIVATE

## 2022-10-07 PROCEDURE — 74011000250 HC RX REV CODE- 250

## 2022-10-07 PROCEDURE — 85025 COMPLETE CBC W/AUTO DIFF WBC: CPT

## 2022-10-07 PROCEDURE — 74011250637 HC RX REV CODE- 250/637: Performed by: NURSE PRACTITIONER

## 2022-10-07 PROCEDURE — 0KQM0ZZ REPAIR PERINEUM MUSCLE, OPEN APPROACH: ICD-10-PCS | Performed by: NURSE PRACTITIONER

## 2022-10-07 PROCEDURE — 75410000003 HC RECOV DEL/VAG/CSECN EA 0.5 HR: Performed by: NURSE PRACTITIONER

## 2022-10-07 PROCEDURE — 36415 COLL VENOUS BLD VENIPUNCTURE: CPT

## 2022-10-07 PROCEDURE — 75410000000 HC DELIVERY VAGINAL/SINGLE: Performed by: NURSE PRACTITIONER

## 2022-10-07 PROCEDURE — 99284 EMERGENCY DEPT VISIT MOD MDM: CPT

## 2022-10-07 RX ORDER — LANOLIN ALCOHOL/MO/W.PET/CERES
1 CREAM (GRAM) TOPICAL
Status: DISCONTINUED | OUTPATIENT
Start: 2022-10-07 | End: 2022-10-08 | Stop reason: HOSPADM

## 2022-10-07 RX ORDER — NALOXONE HYDROCHLORIDE 0.4 MG/ML
0.4 INJECTION, SOLUTION INTRAMUSCULAR; INTRAVENOUS; SUBCUTANEOUS AS NEEDED
Status: DISCONTINUED | OUTPATIENT
Start: 2022-10-07 | End: 2022-10-07 | Stop reason: HOSPADM

## 2022-10-07 RX ORDER — NALOXONE HYDROCHLORIDE 0.4 MG/ML
0.4 INJECTION, SOLUTION INTRAMUSCULAR; INTRAVENOUS; SUBCUTANEOUS AS NEEDED
Status: DISCONTINUED | OUTPATIENT
Start: 2022-10-07 | End: 2022-10-08 | Stop reason: HOSPADM

## 2022-10-07 RX ORDER — ONDANSETRON 4 MG/1
4 TABLET, ORALLY DISINTEGRATING ORAL
Status: ACTIVE | OUTPATIENT
Start: 2022-10-07 | End: 2022-10-08

## 2022-10-07 RX ORDER — FOLIC ACID/MULTIVIT,IRON,MINER 0.4MG-18MG
1 TABLET ORAL DAILY
Status: DISCONTINUED | OUTPATIENT
Start: 2022-10-07 | End: 2022-10-08 | Stop reason: HOSPADM

## 2022-10-07 RX ORDER — IBUPROFEN 800 MG/1
800 TABLET ORAL EVERY 8 HOURS
Status: DISCONTINUED | OUTPATIENT
Start: 2022-10-07 | End: 2022-10-08 | Stop reason: HOSPADM

## 2022-10-07 RX ORDER — OXYTOCIN/RINGER'S LACTATE 30/500 ML
87.3 PLASTIC BAG, INJECTION (ML) INTRAVENOUS AS NEEDED
Status: COMPLETED | OUTPATIENT
Start: 2022-10-07 | End: 2022-10-07

## 2022-10-07 RX ORDER — OXYTOCIN/RINGER'S LACTATE 30/500 ML
10 PLASTIC BAG, INJECTION (ML) INTRAVENOUS AS NEEDED
Status: DISCONTINUED | OUTPATIENT
Start: 2022-10-07 | End: 2022-10-08 | Stop reason: HOSPADM

## 2022-10-07 RX ORDER — SODIUM CHLORIDE, SODIUM LACTATE, POTASSIUM CHLORIDE, CALCIUM CHLORIDE 600; 310; 30; 20 MG/100ML; MG/100ML; MG/100ML; MG/100ML
125 INJECTION, SOLUTION INTRAVENOUS CONTINUOUS
Status: DISCONTINUED | OUTPATIENT
Start: 2022-10-07 | End: 2022-10-08 | Stop reason: HOSPADM

## 2022-10-07 RX ORDER — OXYTOCIN/RINGER'S LACTATE 30/500 ML
87.3 PLASTIC BAG, INJECTION (ML) INTRAVENOUS AS NEEDED
Status: DISCONTINUED | OUTPATIENT
Start: 2022-10-07 | End: 2022-10-08 | Stop reason: HOSPADM

## 2022-10-07 RX ORDER — LIDOCAINE HYDROCHLORIDE 10 MG/ML
INJECTION INFILTRATION; PERINEURAL
Status: COMPLETED
Start: 2022-10-07 | End: 2022-10-07

## 2022-10-07 RX ORDER — SODIUM CHLORIDE 0.9 % (FLUSH) 0.9 %
5-40 SYRINGE (ML) INJECTION EVERY 8 HOURS
Status: DISCONTINUED | OUTPATIENT
Start: 2022-10-07 | End: 2022-10-08 | Stop reason: HOSPADM

## 2022-10-07 RX ORDER — ACETAMINOPHEN 325 MG/1
650 TABLET ORAL
Status: DISCONTINUED | OUTPATIENT
Start: 2022-10-07 | End: 2022-10-08 | Stop reason: HOSPADM

## 2022-10-07 RX ORDER — SIMETHICONE 80 MG
80 TABLET,CHEWABLE ORAL
Status: DISCONTINUED | OUTPATIENT
Start: 2022-10-07 | End: 2022-10-08 | Stop reason: HOSPADM

## 2022-10-07 RX ORDER — SODIUM CHLORIDE 0.9 % (FLUSH) 0.9 %
5-40 SYRINGE (ML) INJECTION AS NEEDED
Status: DISCONTINUED | OUTPATIENT
Start: 2022-10-07 | End: 2022-10-08 | Stop reason: HOSPADM

## 2022-10-07 RX ORDER — DOCUSATE SODIUM 100 MG/1
100 CAPSULE, LIQUID FILLED ORAL
Status: DISCONTINUED | OUTPATIENT
Start: 2022-10-07 | End: 2022-10-08 | Stop reason: HOSPADM

## 2022-10-07 RX ORDER — METHYLERGONOVINE MALEATE 0.2 MG/ML
0.2 INJECTION INTRAVENOUS
Status: ACTIVE | OUTPATIENT
Start: 2022-10-07 | End: 2022-10-08

## 2022-10-07 RX ORDER — GUAIFENESIN 100 MG/5ML
81 LIQUID (ML) ORAL DAILY
COMMUNITY
End: 2022-10-08

## 2022-10-07 RX ADMIN — ACETAMINOPHEN 650 MG: 325 TABLET ORAL at 06:09

## 2022-10-07 RX ADMIN — Medication 999 MILLI-UNITS/MIN: at 03:12

## 2022-10-07 RX ADMIN — LIDOCAINE HYDROCHLORIDE 1 ML: 10 INJECTION, SOLUTION INFILTRATION; PERINEURAL at 03:15

## 2022-10-07 RX ADMIN — IBUPROFEN 800 MG: 800 TABLET, FILM COATED ORAL at 19:34

## 2022-10-07 RX ADMIN — ACETAMINOPHEN 650 MG: 325 TABLET ORAL at 16:36

## 2022-10-07 RX ADMIN — FERROUS SULFATE TAB 325 MG (65 MG ELEMENTAL FE) 325 MG: 325 (65 FE) TAB at 10:11

## 2022-10-07 RX ADMIN — DOCUSATE SODIUM 100 MG: 100 CAPSULE, LIQUID FILLED ORAL at 16:36

## 2022-10-07 RX ADMIN — ACETAMINOPHEN 650 MG: 325 TABLET ORAL at 21:13

## 2022-10-07 RX ADMIN — ACETAMINOPHEN 650 MG: 325 TABLET ORAL at 10:11

## 2022-10-07 RX ADMIN — IBUPROFEN 800 MG: 800 TABLET, FILM COATED ORAL at 11:31

## 2022-10-07 RX ADMIN — IBUPROFEN 800 MG: 800 TABLET, FILM COATED ORAL at 04:03

## 2022-10-07 NOTE — H&P
JUAN RAMON History & Physical    CC: \"I am having contractions\"  Subjective:     Francisco James is a 29 y.o. female [address] @ 38w5d by Estimated Date of Delivery: None noted. who arrives to L&D with chief complaint of uterine contractions that started around 1930 and have gotten worse. She endorses +FM, intact membranes; Pt denies vaginal bleeding, fever/chills, chest pain, SOB, HA, scotomata, sudden swelling, nor malaise. She has had a benign pregnancy course. Pregnancy problems include:   - Body mass index is 30.29 kg/m². -   Patient Active Problem List   Diagnosis Code    Polycystic ovaries E28.2    Supervision of normal first pregnancy Z34.00    Pregnancy Z34.90    Term pregnancy Z34.90       Allergies  - No Known Allergies    Prenatal Labs  Lab Results   Component Value Date/Time    Rubella, External Immune 2022 12:00 AM    GrBStrep, External Negative 2022 12:00 AM    HBsAg, External Negative 2022 12:00 AM    HIV, External Non-reactive 2022 12:00 AM    RPR, External Non-Reactive 2020 12:00 AM    T. Pallidum Antibody, External Non-reactive 2022 12:00 AM    Gonorrhea, External Negative 2022 12:00 AM    Chlamydia, External Negative 2022 12:00 AM     A Positive    OB History  OB History          4    Para   3    Term   3       0    AB   0    Living   3         SAB   0    IAB   0    Ectopic   0    Molar        Multiple   0    Live Births   3                 PMHx  Past Medical History:   Diagnosis Date    Anemia     Asthma     exercise induced    Fibrocystic breast disease 3/2012    Pap smear for cervical cancer screening 2016    negative    PCOS (polycystic ovarian syndrome)     Polycystic disease, ovaries         PSHx  History reviewed. No pertinent surgical history.      F/SHx  Family History   Problem Relation Age of Onset    Other Sister         PCOS    Thyroid Disease Sister         hypothyroid, prolactinoma    Breast Cancer Paternal Aunt     Colon Cancer Maternal Grandfather     Cancer Paternal Grandmother         melanoma    Diabetes Paternal Grandfather     Heart Disease Paternal Grandfather     Diabetes Father     No Known Problems Mother       Social History     Socioeconomic History    Marital status:      Spouse name: Not on file    Number of children: Not on file    Years of education: Not on file    Highest education level: Not on file   Occupational History    Not on file   Tobacco Use    Smoking status: Never    Smokeless tobacco: Never   Vaping Use    Vaping Use: Never used   Substance and Sexual Activity    Alcohol use: No     Alcohol/week: 0.0 standard drinks    Drug use: No    Sexual activity: Yes     Partners: Male     Birth control/protection: None   Other Topics Concern     Service Not Asked    Blood Transfusions Not Asked    Caffeine Concern Not Asked    Occupational Exposure Not Asked    Hobby Hazards Not Asked    Sleep Concern Not Asked    Stress Concern Not Asked    Weight Concern Not Asked    Special Diet Not Asked    Back Care Not Asked    Exercise Not Asked    Bike Helmet Not Asked    Seat Belt Not Asked    Self-Exams Not Asked   Social History Narrative    Not on file     Social Determinants of Health     Financial Resource Strain: Not on file   Food Insecurity: Not on file   Transportation Needs: Not on file   Physical Activity: Not on file   Stress: Not on file   Social Connections: Not on file   Intimate Partner Violence: Not on file   Housing Stability: Not on file       Home Medications:  Prior to Admission Medications   Prescriptions Last Dose Informant Patient Reported? Taking? DOCOSAHEXANOIC ACID/EPA (FISH OIL PO) 10/6/2022  Yes Yes   Sig: Take  by mouth. LACTOBACILLUS ACIDOPHILUS (PROBIOTIC PO) 10/6/2022  Yes Yes   Sig: Take  by mouth. PRENATAL VIT W-CA,FE,FA,<1 MG, (PRENATAL VITAMIN PO) 10/6/2022  Yes Yes   Sig: Take  by mouth.    aspirin 81 mg chewable tablet 10/6/2022  Yes Yes   Sig: Take 81 mg by mouth daily. cholecalciferol (VITAMIN D3) (2,000 UNITS /50 MCG) cap capsule 10/6/2022  Yes Yes   Sig: Take  by mouth daily. ferrous sulfate 325 mg (65 mg iron) tablet 10/6/2022  Yes Yes   Sig: Take  by mouth Daily (before breakfast). magnesium 250 mg tab 9/7/2022  Yes Yes   Sig: Take  by mouth. Facility-Administered Medications: None        Review of Systems:  A comprehensive review of systems was negative except for that written in the History of Present Illness. Objective:     Vitals:    10/07/22 0147 10/07/22 0222 10/07/22 0223   BP:   116/68   Pulse:   76   Resp:   16   Temp:   97.6 °F (36.4 °C)   SpO2:  100% 100%   Weight: 82.6 kg (182 lb)     Height: 5' 5\" (1.651 m)        Body mass index is 30.29 kg/m². Physical Exam:  General:  Alert, cooperative, no distress, appears stated age. Lungs:    Symmetrical expansion, non-labored   Heart:  Regular rate    GI/Abdomen:   Soft, non-tender. Gravid. Extremities: Extremities normal, atraumatic, no cyanosis or edema. Skin: Skin color, texture, turgor normal. No rashes or lesions   /Cervical Exam: no lesions or erythema  Cervix: Cervical Exam  Dilation (cm): 7  Eff: 90 %  Station: -1  Vaginal exam done by? : Cathy Springer RN  Fetal Presentation: Vertex by Leopolds  Pelvimetry: Adequate  Fetal Size: AGA      Electronic Fetal Monitoring    Fetal Heart Rate:    Baseline 130, moderate variability, +accels, no decels, cat 1 FHT  Uterine Contractions: External, q2-4min, relaxed to palpation    Assessment:   29 y.o. female [address] @  38w5 d by NIKKI of Estimated Date of Delivery: None noted. Admission diagnoses:   -active labor     Pregnancy problems include:   -   Patient Active Problem List   Diagnosis Code    Polycystic ovaries E28.2    Supervision of normal first pregnancy Z34.00    Pregnancy Z34.90    Term pregnancy Z34.90     - Body mass index is 30.29 kg/m².     Pertinent Labs:  - A Positive  -   Lab Results   Component Value Date/Time Rubella, External Immune 2022 12:00 AM    GrBStrep, External Negative 2022 12:00 AM    HBsAg, External Negative 2022 12:00 AM    HIV, External Non-reactive 2022 12:00 AM    RPR, External Non-Reactive 2020 12:00 AM    T. Pallidum Antibody, External Non-reactive 2022 12:00 AM    Gonorrhea, External Negative 2022 12:00 AM    Chlamydia, External Negative 2022 12:00 AM       EFM:   - Category 1  - NST: Fetal NST Findings: reactive with indication of establishing a baseline fetal evaluation conducted over a minimum of 20 minutes    Plan:   Admit to L&D for labor    Tests/Labs/Monitoring ordered: Electronic Fetal Monitoring Intermittent EFM and per unit policy, SVE, CBC w/o DIF, and Sample to blood bank (hold)  GBS negative  Admit to labor  Anticipate     Patient has been counseled regarding this plan of care and is in agreement; all questions answered.     Collaborative MD: Dr. Maria Teresa Nguyen By:  Gordo Desai CNM      10/7/2022 2:06 AM

## 2022-10-07 NOTE — PROGRESS NOTES
10/7/2022  9:51 AM    CM met with VANESSA to complete initial assessment and begin discharge planning. MOB verified and confirmed demographics. VANESSA lives with FOB- Jhony Zuleta ( [phone]), along with their 5,3, and 1yr old, at the address on file. VANESSA is not employed and plans to be home with infant. FOB is employed and will be taking adequate time off. VANESSA reports she has good family support, and feels like she has the support she needs when she returns home. VANESSA plans to breast feed baby and has pump to use at home. Dr. Delano Cruz will provide follow up care for infant. VANESSA has car seat, bassinet/crib, clothing, bottles and all necessary supplies for baby. VANESSA has [employer],  and will be adding baby to this policy. CM discussed process to add baby to insurance, MOB verbalized understanding. VANESSA denied needing WIC/Medicaid services. Care Management Interventions  PCP Verified by CM: Yes ([facility])  Mode of Transport at Discharge:  Other (see comment)  Transition of Care Consult (CM Consult): Discharge Planning  Support Systems: Spouse/Significant Other, Other Family Member(s)  Confirm Follow Up Transport: Family  Discharge Location  Patient Expects to be Discharged to[de-identified] Home with family assistance  Calderon Felix

## 2022-10-07 NOTE — PROGRESS NOTES
0130: Patient ambulatory onto unit with FOB. Patient reports contractions that began at 1930 last night. Denies LOF or vaginal bleeding. Patient reports positive FM. Abdomen palpated, soft and non-tender. 0140: Patient placed on EFM. 0144: SVE per Colleen Ramirez- pt is 7/90/-1     0214: 8/90/-1 per Guero TOPETEM.    0214: Patient can be on intermittent monitoring per Sundeep Negro.     0220: Balaji Yeager; Colleen Ramirez RN; and Denton Gaspar RN at the bedside and remaining with the patient at this time. 0234: Spot check/intermittent monitoring at this time. FHR reassuring through and after contractions. 0245: Patient actively pushing. RN remains in continuous attendance at the bedside. Assessment & evaluation of fetal heart rate ongoing via continuous EFM. 80: RN remained at bedside throughout pushing. EFM continuously assessed. Vaginal delivery of viable infant. Delivery QBL: 350 mL  2 hour QBL: 56 mL  Total QBL: 406 mL    0330: This RN rounding on the patient at this time. Giving the patient ice water at this time. 0500: This RN rounding on the patient at this time. The patient and her  are sleeping soundly. 0700: Bedside and Verbal shift change report given to Jesika Oliver RN  (oncoming nurse) by Denton Gaspar RN and Skip Ramos RN (offgoing nurse). Report included the following information SBAR, Kardex, Procedure Summary, Intake/Output, MAR, Accordion, Recent Results, Med Rec Status, Alarm Parameters , and Quality Measures.

## 2022-10-07 NOTE — PROGRESS NOTES
0130: Patient ambulatory onto unit with FOB. Patient reports contractions that began at 1930 last night. Denies LOF or vaginal bleeding. Patient reports positive FM. Abdomen palpated, soft and non-tender. This RN orienting JHONATAN Liu RN and overseeing patient care. 0140: Patient placed on EFM.     0206: Intermittent monitoring okay per Subudhi CNM.    0144: SVE by this RN: 7/90/-1    0214: 8/90/-1 per Subudhi CNM.    0700: Bedside and Verbal shift change report given to MARIA LUZ Leary RN (oncoming nurse) by Davida Kapadia RN and JHONATAN Orellana (offgoing nurse). Report included the following information SBAR, Intake/Output, MAR, Recent Results, and Quality Measures.

## 2022-10-07 NOTE — ROUTINE PROCESS
SBAR OUT Report: Mother    Verbal report given to Rio Amato (full name & credentials) on this patient, who is now being transferred to MIU (unit) for routine progression of care. The patient is not wearing a green \"Anesthesia-Duramorph\" band. Report consisted of patient's Situation, Background, Assessment and Recommendations (SBAR). [place] ID bands were compared with the identification form, and verified with the patient and receiving nurse. Information from the SBAR, Kardex, Intake/Output, MAR, and Recent Results and the Edwina Report was reviewed with the receiving nurse; opportunity for questions and clarification provided.

## 2022-10-07 NOTE — LACTATION NOTE
This note was copied from a baby's chart. This is mom 4ths baby to BF. Just weaned now 21 month old, several months ago. Mom states feeds are going well and she is nursing on demand to early cues of hunger, without discomfort. Mom encouarged to call next feed. Discussed with mother her plan for feeding. Reviewed the benefits of exclusive breast milk feeding during the hospital stay. Informed her of the risks of using formula to supplement in the first few days of life as well as the benefits of successful breast milk feeding; referred her to the Breastfeeding booklet about this information. She acknowledges understanding of information reviewed and states that it is her plan to breastfeed her infant. Will support her choice and offer additional information as needed. Reviewed breastfeeding basics:  How milk is made and normal  breastfeeding behaviors discussed. Supply and demand,  stomach size, early feeding cues, skin to skin bonding with comfortable positioning and baby led latch-on reviewed. How to identify signs of successful breastfeeding sessions reviewed; education on asymetrical latch, signs of effective latching vs shallow, in-effective latching, normal  feeding frequency and duration and expected infant output discussed. Normal course of breastfeeding discussed including the AAP's recommendation that children receive exclusive breast milk feedings for the first six months of life with breast milk feedings to continue through the first year of life and/or beyond as complimentary table foods are added. Breastfeeding Booklet and Warm line information provided with discussion. Discussed typical  weight loss and the importance of pediatrician appointment within 24-48 hours of discharge, at 2 weeks of life and normalcy of requesting pediatric weight checks as needed in between visits.      Pt will successfully establish breastfeeding by feeding in response to early feeding cues   or wake every 3h, will obtain deep latch, and will keep log of feedings/output. Taught to BF at hunger cues and or q 2-3 hrs and to offer 10-20 drops of hand expressed colostrum at any non-feeds.       Breast Assessment  Left Breast: Medium, Large  Left Nipple: Everted, Intact  Right Breast: Medium, Large  Right Nipple: Everted, Intact (reappearing milk bleb on right nipple)  Breast- Feeding Assessment  Attends Breast-Feeding Classes: No  Breast-Feeding Experience: Yes  Breast Trauma/Surgery: No (has milk bleb that appears on right nipple)  Type/Quality: Good (per mother)  Lactation Consultant Visits  Breast-Feedings:  (didn't see baby at breast thsi feed)  Mother/Infant Observation  Mother Observation: Breast comfortable, Recognizes feeding cues  Infant Observation: Opens mouth, Feeding cues

## 2022-10-08 VITALS
BODY MASS INDEX: 30.32 KG/M2 | HEIGHT: 65 IN | TEMPERATURE: 98.1 F | WEIGHT: 182 LBS | DIASTOLIC BLOOD PRESSURE: 54 MMHG | OXYGEN SATURATION: 99 % | SYSTOLIC BLOOD PRESSURE: 90 MMHG | RESPIRATION RATE: 14 BRPM | HEART RATE: 52 BPM

## 2022-10-08 PROCEDURE — 74011250637 HC RX REV CODE- 250/637: Performed by: NURSE PRACTITIONER

## 2022-10-08 PROCEDURE — 74011250637 HC RX REV CODE- 250/637: Performed by: OBSTETRICS & GYNECOLOGY

## 2022-10-08 RX ORDER — DOCUSATE SODIUM 100 MG/1
100 CAPSULE, LIQUID FILLED ORAL
Qty: 30 CAPSULE | Refills: 0 | Status: SHIPPED | OUTPATIENT
Start: 2022-10-08 | End: 2023-01-06

## 2022-10-08 RX ORDER — ACETAMINOPHEN 325 MG/1
650 TABLET ORAL
Qty: 30 TABLET | Refills: 0 | Status: SHIPPED | OUTPATIENT
Start: 2022-10-08

## 2022-10-08 RX ORDER — IBUPROFEN 800 MG/1
800 TABLET ORAL EVERY 8 HOURS
Qty: 30 TABLET | Refills: 0 | Status: SHIPPED | OUTPATIENT
Start: 2022-10-08

## 2022-10-08 RX ADMIN — Medication 1 TABLET: at 08:53

## 2022-10-08 RX ADMIN — FERROUS SULFATE TAB 325 MG (65 MG ELEMENTAL FE) 325 MG: 325 (65 FE) TAB at 08:53

## 2022-10-08 RX ADMIN — IBUPROFEN 800 MG: 800 TABLET, FILM COATED ORAL at 04:02

## 2022-10-08 RX ADMIN — MUPIROCIN: 20 OINTMENT TOPICAL at 08:53

## 2022-10-08 RX ADMIN — ACETAMINOPHEN 650 MG: 325 TABLET ORAL at 14:47

## 2022-10-08 RX ADMIN — DOCUSATE SODIUM 100 MG: 100 CAPSULE, LIQUID FILLED ORAL at 08:53

## 2022-10-08 RX ADMIN — ACETAMINOPHEN 650 MG: 325 TABLET ORAL at 08:53

## 2022-10-08 RX ADMIN — IBUPROFEN 800 MG: 800 TABLET, FILM COATED ORAL at 12:30

## 2022-10-08 RX ADMIN — ACETAMINOPHEN 650 MG: 325 TABLET ORAL at 01:06

## 2022-10-08 NOTE — ROUTINE PROCESS
Bedside and Verbal shift change report given to Angelica Dyer RN  (oncoming nurse) by Gerson Diamond  (offgoing nurse). Report included the following information SBAR, Kardex, Intake/Output, MAR, and Recent Results.

## 2022-10-08 NOTE — PROGRESS NOTES
Post-Partum Day Number 1 Progress Note    Kasey Eason     Assessment: Doing well, post partum day 1    Plan:  - Continue routine postpartum and perineal care as well as maternal education.  - The risks and benefits of the circumcision  procedure and anesthesia including: bleeding, infection, variability of cosmetic results were discussed at length with the mother. She is aware that future repeat procedures may be necessary. She gives informed consent to proceed as noted and her questions are answered. - Plan discharge home [address] Discharge Date: Today. Information for the patient's :  Jaja Livingston, Male Alejandra Valdez [[id number]]   Vaginal, Spontaneous  Patient doing well without significant complaint. Voiding without difficulty, normal lochia. Vitals:  Visit Vitals  BP (!) 90/54 (BP 1 Location: Right upper arm, BP Patient Position: Supine)   Pulse (!) 52   Temp 98.1 °F (36.7 °C)   Resp 14   Ht 5' 5\" (1.651 m)   Wt 82.6 kg (182 lb)   SpO2 99%   Breastfeeding Unknown   BMI 30.29 kg/m²     Temp (24hrs), Av °F (36.7 °C), Min:97.9 °F (36.6 °C), Max:98.1 °F (36.7 °C)        Exam:   Patient without distress. Fundus firm, nontender per nursing fundal checks. Perineum with normal lochia noted per nursing assessment. Lower extremities are negative for pathological edema.     Labs:     Lab Results   Component Value Date/Time    WBC 11.5 (H) 10/07/2022 02:11 AM    WBC 8.9 2021 09:05 AM    WBC 8.0 2017 02:34 PM    WBC 11.8 (H) 01/15/2017 01:57 AM    WBC 11.9 (H) 10/17/2016 04:24 PM    WBC 11.0 (H) 2016 04:41 PM    WBC 8.2 2016 05:13 PM    HGB 12.0 10/07/2022 02:11 AM    HGB 12.1 2021 09:05 AM    HGB 13.4 2017 02:34 PM    HGB 12.2 01/15/2017 01:57 AM    HGB 11.8 10/17/2016 04:24 PM    HGB 12.8 2016 04:41 PM    HGB 13.4 2016 05:13 PM    HCT 35.3 10/07/2022 02:11 AM    HCT 35.8 2021 09:05 AM    HCT 39.7 2017 02:34 PM    HCT 37.3 01/15/2017 01:57 AM    HCT 34.4 10/17/2016 04:24 PM    HCT 38.9 06/01/2016 04:41 PM    HCT 39.6 03/21/2016 05:13 PM    PLATELET 009 65/79/4118 02:11 AM    PLATELET 765 29/30/0584 09:05 AM    PLATELET 680 85/06/3482 02:34 PM    PLATELET 866 88/02/8137 01:57 AM    PLATELET 483 31/01/4251 04:24 PM    PLATELET 519 42/33/4294 04:41 PM    PLATELET 529 53/23/1383 05:13 PM    Hgb, External 12.8 06/01/2016 12:00 AM    Hct, External 38.9 06/01/2016 12:00 AM    Platelet cnt., External 263 06/01/2016 12:00 AM       No results found for this or any previous visit (from the past 24 hour(s)).

## 2022-10-08 NOTE — DISCHARGE SUMMARY
Obstetrical Discharge Summary     Name: Henry Horvath MRN: [id number]  SSN: [id number]    YOB: 1985  Age: 29 y.o. Sex: female      Allergies: Patient has no known allergies. Admit Date: 10/7/2022    Discharge Date: 10/8/2022     Admitting Physician: Zamzam St MD     Attending Physician:  Spence Severance, MD     * Admission Diagnoses: Pregnancy [Z34.90]    * Discharge Diagnoses:   Information for the patient's :  Rivka Jews Male Carla Puentes [[id number]]   Delivery of a 3.3 kg male infant via Vaginal, Spontaneous on 10/7/2022 at 2:58 AM  by Eleno Veronica. Apgars were 9  and 9 . Additional Diagnoses:   Hospital Problems as of 10/8/2022 Date Reviewed: 2017            Codes Class Noted - Resolved POA    Pregnancy ICD-10-CM: Z34.90  ICD-9-CM: V22.2  1/15/2017 - Present Unknown          Lab Results   Component Value Date/Time    ABO/Rh(D) A POSITIVE 2021 09:05 AM    Rubella, External Immune 2022 12:00 AM    GrBStrep, External Negative 2022 12:00 AM    ABO,Rh A Positive 2022 12:00 AM      Immunization History   Administered Date(s) Administered    Influenza, FLUARIX, FLULAVAL, FLUZONE (age 10 mo+) AND AFLURIA, (age 1 y+), PF, 0.5mL 10/31/2016    Tdap 10/31/2016       * Procedures: none  * No surgery found *    * Discharge Condition: [facility]: Patient presented in active labor and delivered shortly after admission. Delivery was uncomplicated. Normal hospital course following the delivery. * Disposition: Home    Discharge Medications:   Current Discharge Medication List        START taking these medications    Details   acetaminophen (TYLENOL) 325 mg tablet Take 2 Tablets by mouth every four (4) hours as needed for Pain. Qty: 30 Tablet, Refills: 0  Start date: 10/8/2022      docusate sodium (COLACE) 100 mg capsule Take 1 Capsule by mouth daily as needed for Constipation for up to 90 days.   Qty: 30 Capsule, Refills: 0  Start date: 10/8/2022, End date: 1/6/2023      ibuprofen (MOTRIN) 800 mg tablet Take 1 Tablet by mouth every eight (8) hours. Qty: 30 Tablet, Refills: 0  Start date: 10/8/2022           CONTINUE these medications which have NOT CHANGED    Details   cholecalciferol (VITAMIN D3) (2,000 UNITS /50 MCG) cap capsule Take  by mouth daily. magnesium 250 mg tab Take  by mouth. ferrous sulfate 325 mg (65 mg iron) tablet Take  by mouth Daily (before breakfast). PRENATAL VIT W-CA,FE,FA,<1 MG, (PRENATAL VITAMIN PO) Take  by mouth. DOCOSAHEXANOIC ACID/EPA (FISH OIL PO) Take  by mouth. LACTOBACILLUS ACIDOPHILUS (PROBIOTIC PO) Take  by mouth. STOP taking these medications       aspirin 81 mg chewable tablet Comments:   Reason for Stopping:               * Follow-up Care/Patient Instructions:   Activity: Activity as tolerated  Diet: Regular Diet  Wound Care: None needed    Follow-up Information       Follow up With Specialties Details Why Contact Info    Julio Warren [place] [id number]  [phone]

## 2023-05-24 RX ORDER — FERROUS SULFATE 325(65) MG
TABLET ORAL
COMMUNITY

## 2023-05-24 RX ORDER — IBUPROFEN 800 MG/1
800 TABLET ORAL EVERY 8 HOURS
COMMUNITY
Start: 2022-10-08

## 2023-05-24 RX ORDER — ACETAMINOPHEN 325 MG/1
650 TABLET ORAL EVERY 4 HOURS PRN
COMMUNITY
Start: 2022-10-08